# Patient Record
Sex: FEMALE | Race: BLACK OR AFRICAN AMERICAN | NOT HISPANIC OR LATINO | Employment: UNEMPLOYED | URBAN - METROPOLITAN AREA
[De-identification: names, ages, dates, MRNs, and addresses within clinical notes are randomized per-mention and may not be internally consistent; named-entity substitution may affect disease eponyms.]

---

## 2017-11-20 ENCOUNTER — OFFICE VISIT (OUTPATIENT)
Dept: BARIATRICS/WEIGHT MGMT | Facility: CLINIC | Age: 52
End: 2017-11-20

## 2017-11-20 VITALS
WEIGHT: 139.5 LBS | RESPIRATION RATE: 18 BRPM | HEART RATE: 74 BPM | OXYGEN SATURATION: 99 % | TEMPERATURE: 97.8 F | HEIGHT: 58 IN | DIASTOLIC BLOOD PRESSURE: 103 MMHG | BODY MASS INDEX: 29.28 KG/M2 | SYSTOLIC BLOOD PRESSURE: 163 MMHG

## 2017-11-20 DIAGNOSIS — E55.9 HYPOVITAMINOSIS D: ICD-10-CM

## 2017-11-20 DIAGNOSIS — R53.83 FATIGUE, UNSPECIFIED TYPE: Primary | ICD-10-CM

## 2017-11-20 DIAGNOSIS — K91.2 POSTGASTRECTOMY MALABSORPTION: ICD-10-CM

## 2017-11-20 DIAGNOSIS — Z13.21 MALNUTRITION SCREEN: ICD-10-CM

## 2017-11-20 DIAGNOSIS — Z90.3 POSTGASTRECTOMY MALABSORPTION: ICD-10-CM

## 2017-11-20 DIAGNOSIS — Z13.0 SCREENING, IRON DEFICIENCY ANEMIA: ICD-10-CM

## 2017-11-20 PROCEDURE — 99214 OFFICE O/P EST MOD 30 MIN: CPT | Performed by: SURGERY

## 2017-11-20 RX ORDER — ESCITALOPRAM OXALATE 20 MG/1
20 TABLET ORAL DAILY
COMMUNITY
Start: 2017-10-21

## 2017-11-20 RX ORDER — ESTRADIOL 0.1 MG/D
1 FILM, EXTENDED RELEASE TRANSDERMAL 2 TIMES WEEKLY
COMMUNITY
Start: 2017-10-21 | End: 2020-01-11 | Stop reason: HOSPADM

## 2017-11-20 NOTE — PATIENT INSTRUCTIONS
Discussed how to improve habits:    Breakfast to be a Premier  Snack (MUST bring from home): apple and peanut butter, banana  Lunch: will buy 4 Oz. Chicken at work  Dinner: meat + vegetable side dish.    Snack after dinner: keep to 150 calories or less    Walk for exercise 30 minutes, 5 days per week.

## 2017-11-20 NOTE — PROGRESS NOTES
"[facility] Bariatric Surgery  [address]  [facility] [id number] [phone]        Patient Name:  Iris Varma.  :  1965      Date of Visit: 2017      Reason for Visit:   4 years postop      HPI: Iris Varma is a 52 y.o. female s/p LSG/HHR 1913 by GDW.  Last office visit was in 2015 and apparently had not been seen since DOS (did not even make 1 week appointment).  At that time was unsure of how much protein intake and had complaint of fatigue/hair loss.  LOV weight was 125 pounds.      Doing well. C/o return of reflux x 2 months.  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  Does not take any PPI/H2 blocker.  Occasionally chews Tums.  Reflux symptoms are daily.  Worse after tomatoes.  Denies dysphagia, nausea, vomiting and abdominal pain.  Getting ??g prot/day.  \"I'm not eating any protein.  I don't know why not.\"  Drinking 32 fluid oz/day.  Exercising: walks at work as an , does no other special exercise.     Presurgery weight: 181 pounds.  Today's weight is 139 lb 8 oz (63.3 kg) pounds, today's  Body mass index is 29.16 kg/(m^2)., and her weight loss since surgery is 42 pounds.  Has gained 14 pound since last visit.      She attributes being back to work as why she hasn't been doing well planning meals/portions/healthful foods.      Breakfast: pack of peanut butter cracker (6g protein, 250 emory)  SnacK: vending machine, chips or peanut butter crackers (6g protein, 250 emory)  Lunch: 4 oz. Chicken (20 g, 200 emory)  Dinner: Piece of pizza or spaghetti + meat sauce (protein ~5 g, calories ~500).    +/- SnacK: maybe a few more bites of spaghetti  Drinks real soft drink x 1 qday 20 oz.  No sweet tea or sugary coffee drink.    \"I'm a sweet addict:\"  Cinnamon rolls, cookies, cheesecake (may eat in place of a meal)    Discussed how to improve habits:  Breakfast to be a Premier  Snack (MUST bring from " "home): apple and peanut butter, banana  Lunch: will buy 4 Oz. Chicken at work  Dinner: meat + vegetable side dish.    Snack after dinner: keep to 150 calories or less  Walk for exercise 30 minutes, 5 days per week.    Past Medical History:   Diagnosis Date   • Allergic rhinitis    • Anxiety    • Arthritis    • Chronic back pain    • Depression    • Diabetes mellitus    • Dyspnea on exertion    • Elevated LFTs    • Fatigue    • GERD (gastroesophageal reflux disease)    • Helicobacter pylori (H. pylori) infection    • Hiatal hernia    • Hyperlipidemia    • Hypertension    • Hypokalemia    • Insomnia    • Joint pain    • Obesity    • Peripheral edema    • Renal insufficiency    • Vitamin D deficiency      Past Surgical History:   Procedure Laterality Date   • BREAST BIOPSY Right 2012   •  SECTION  ,    • COLONOSCOPY     • GASTRIC SLEEVE LAPAROSCOPIC  2013    HHR   GDW   • HYSTERECTOMY      w/ oophorectomy   • TONSILLECTOMY       Outpatient Prescriptions Marked as Taking for the 17 encounter (Office Visit) with Sania Jaime MD   Medication Sig Dispense Refill   • escitalopram (LEXAPRO) 20 MG tablet      • estradiol (MINIVELLE, VIVELLE-DOT) 0.1 MG/24HR patch      • Naproxen Sodium (ALEVE PO) Take  by mouth.         Allergies   Allergen Reactions   • Lisinopril Swelling     Lips       Social History     Social History   • Marital status:      Spouse name: N/A   • Number of children: N/A   • Years of education: N/A     Occupational History   • Not on file.     Social History Main Topics   • Smoking status: Never Smoker   • Smokeless tobacco: Never Used   • Alcohol use No   • Drug use: No   • Sexual activity: Not on file     Other Topics Concern   • Not on file     Social History Narrative       BP (!) 163/103 (BP Location: Left arm, Patient Position: Sitting, Cuff Size: Large Adult)  Pulse 74  Temp 97.8 °F (36.6 °C) (Temporal Artery )   Resp 18  Ht 58\" (147.3 cm) "  Wt 139 lb 8 oz (63.3 kg) Comment: lov 125lb  SpO2 99%  BMI 29.16 kg/m2    Physical Exam   Constitutional: She is oriented to person, place, and time. She appears well-developed and well-nourished. No distress.   HENT:   Head: Normocephalic and atraumatic.   Mouth/Throat: No oropharyngeal exudate.   Eyes: EOM are normal. Pupils are equal, round, and reactive to light. No scleral icterus.   Pulmonary/Chest: Effort normal. No respiratory distress.   Abdominal: Soft. She exhibits no distension. There is no tenderness.   Incisions well-healed   Neurological: She is alert and oriented to person, place, and time. No cranial nerve deficit.   Skin: Skin is warm and dry.   Psychiatric: She has a normal mood and affect. Her behavior is normal. Judgment and thought content normal.         Assessment:  4 years s/p LSG    ICD-10-CM ICD-9-CM   1. Fatigue, unspecified type R53.83 780.79   2. Screening, iron deficiency anemia Z13.0 V78.0   3. 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 Hypovitaminosis D E55.9 268.9   5. Malnutrition screen Z13.21 V77.2         Plan:  Doing well. Continue w/ good food choices and healthy habits.  Strive for protein >70g/day.  Start routine exercise.  Other instructions above and given to patient in after visit summary.  Pt instructed to research keto diet and try it to break the cycle of eating carbs.  Routine bariatric labs ordered.  Continue vitamins w/ adjustments pending lab results.  Call w/ problems/concerns.     The patient was instructed to follow up in 6 months for weigh in, sooner if needed.    note: approx 15 of the 25 minute visit was spent counseling on nutrition and necessary dietary/lifestyle modifications.    Sania Jaime MD

## 2017-11-23 LAB
25(OH)D3+25(OH)D2 SERPL-MCNC: 12.1 NG/ML (ref 30–100)
A-TOCOPHEROL VIT E SERPL-MCNC: 19.6 MG/L (ref 5.3–16.8)
ALBUMIN SERPL-MCNC: 3.9 G/DL (ref 3.5–5.5)
ALBUMIN/GLOB SERPL: 1.3 {RATIO} (ref 1.2–2.2)
ALP SERPL-CCNC: 99 IU/L (ref 39–117)
ALT SERPL-CCNC: 8 IU/L (ref 0–32)
AST SERPL-CCNC: 15 IU/L (ref 0–40)
BASOPHILS # BLD AUTO: 0.1 X10E3/UL (ref 0–0.2)
BASOPHILS NFR BLD AUTO: 1 %
BILIRUB SERPL-MCNC: 0.5 MG/DL (ref 0–1.2)
BUN SERPL-MCNC: 12 MG/DL (ref 6–24)
BUN/CREAT SERPL: 14 (ref 9–23)
CALCIUM SERPL-MCNC: 9.1 MG/DL (ref 8.7–10.2)
CHLORIDE SERPL-SCNC: 100 MMOL/L (ref 96–106)
CO2 SERPL-SCNC: 26 MMOL/L (ref 18–29)
CREAT SERPL-MCNC: 0.84 MG/DL (ref 0.57–1)
EOSINOPHIL # BLD AUTO: 0.2 X10E3/UL (ref 0–0.4)
EOSINOPHIL NFR BLD AUTO: 2 %
ERYTHROCYTE [DISTWIDTH] IN BLOOD BY AUTOMATED COUNT: 16.9 % (ref 12.3–15.4)
FERRITIN SERPL-MCNC: 29 NG/ML (ref 15–150)
FOLATE SERPL-MCNC: 9.7 NG/ML
GFR SERPLBLD CREATININE-BSD FMLA CKD-EPI: 80 ML/MIN/1.73
GFR SERPLBLD CREATININE-BSD FMLA CKD-EPI: 92 ML/MIN/1.73
GLOBULIN SER CALC-MCNC: 3 G/DL (ref 1.5–4.5)
GLUCOSE SERPL-MCNC: 93 MG/DL (ref 65–99)
HCT VFR BLD AUTO: 36 % (ref 34–46.6)
HGB BLD-MCNC: 12.1 G/DL (ref 11.1–15.9)
IMM GRANULOCYTES # BLD: 0 X10E3/UL (ref 0–0.1)
IMM GRANULOCYTES NFR BLD: 0 %
IRON SERPL-MCNC: 107 UG/DL (ref 27–159)
LYMPHOCYTES # BLD AUTO: 2.7 X10E3/UL (ref 0.7–3.1)
LYMPHOCYTES NFR BLD AUTO: 30 %
MAGNESIUM SERPL-MCNC: 2.1 MG/DL (ref 1.6–2.3)
MCH RBC QN AUTO: 28.3 PG (ref 26.6–33)
MCHC RBC AUTO-ENTMCNC: 33.6 G/DL (ref 31.5–35.7)
MCV RBC AUTO: 84 FL (ref 79–97)
METHYLMALONATE SERPL-SCNC: 93 NMOL/L (ref 0–378)
MONOCYTES # BLD AUTO: 0.5 X10E3/UL (ref 0.1–0.9)
MONOCYTES NFR BLD AUTO: 5 %
NEUTROPHILS # BLD AUTO: 5.5 X10E3/UL (ref 1.4–7)
NEUTROPHILS NFR BLD AUTO: 62 %
PHOSPHATE SERPL-MCNC: 3 MG/DL (ref 2.5–4.5)
PLATELET # BLD AUTO: 368 X10E3/UL (ref 150–379)
POTASSIUM SERPL-SCNC: 4.1 MMOL/L (ref 3.5–5.2)
PREALB SERPL-MCNC: 19 MG/DL (ref 10–36)
PROT SERPL-MCNC: 6.9 G/DL (ref 6–8.5)
PTH-INTACT SERPL-MCNC: 62 PG/ML (ref 15–65)
RBC # BLD AUTO: 4.27 X10E6/UL (ref 3.77–5.28)
SODIUM SERPL-SCNC: 140 MMOL/L (ref 134–144)
VIT A SERPL-MCNC: 40 UG/DL (ref 20–65)
VIT B1 BLD-SCNC: 84.7 NMOL/L (ref 66.5–200)
WBC # BLD AUTO: 8.9 X10E3/UL (ref 3.4–10.8)
ZINC SERPL-MCNC: 67 UG/DL (ref 56–134)

## 2017-12-04 RX ORDER — ERGOCALCIFEROL 1.25 MG/1
50000 CAPSULE ORAL WEEKLY
Qty: 12 CAPSULE | Refills: 0 | Status: SHIPPED | OUTPATIENT
Start: 2017-12-04 | End: 2019-10-09

## 2018-07-06 ENCOUNTER — OFFICE VISIT (OUTPATIENT)
Dept: BARIATRICS/WEIGHT MGMT | Facility: CLINIC | Age: 53
End: 2018-07-06

## 2018-07-06 VITALS
OXYGEN SATURATION: 99 % | RESPIRATION RATE: 18 BRPM | WEIGHT: 141 LBS | SYSTOLIC BLOOD PRESSURE: 110 MMHG | HEIGHT: 58 IN | DIASTOLIC BLOOD PRESSURE: 70 MMHG | TEMPERATURE: 97.8 F | BODY MASS INDEX: 29.6 KG/M2 | HEART RATE: 68 BPM

## 2018-07-06 DIAGNOSIS — R10.13 EPIGASTRIC PAIN: ICD-10-CM

## 2018-07-06 DIAGNOSIS — K21.9 GASTROESOPHAGEAL REFLUX DISEASE, ESOPHAGITIS PRESENCE NOT SPECIFIED: Primary | ICD-10-CM

## 2018-07-06 PROCEDURE — 99214 OFFICE O/P EST MOD 30 MIN: CPT | Performed by: SURGERY

## 2018-07-06 RX ORDER — OMEPRAZOLE 40 MG/1
40 CAPSULE, DELAYED RELEASE ORAL DAILY
Qty: 3 CAPSULE | Refills: 3 | Status: SHIPPED | OUTPATIENT
Start: 2018-07-06 | End: 2019-10-09

## 2018-07-06 RX ORDER — CARVEDILOL 12.5 MG/1
12.5 TABLET ORAL 2 TIMES DAILY WITH MEALS
COMMUNITY

## 2018-07-06 NOTE — PROGRESS NOTES
[facility] Bariatric Surgery  [address]  [facility] [id number] [phone]        Patient Name: Iris Varma.  YOB: 1965      Date of Visit: 2018      Reason for Visit:  GERD    HPI:  Iris Varma is a 52 y.o. female s/p LSG/HHR 1913 by GDW.  Has had reflux for a while.  On Pepcid since she went to ED a few months ago for chest pain that was attributed to reflux.  Takes Tums frequently.  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      C/o severe epigastric pain that radiates up to chest and around left ribs.  Worse with popcorn.  A little worse with dairy.  Avoids spicy foods.        Past Medical History:   Diagnosis Date   • Allergic rhinitis    • Anxiety    • Arthritis    • Chronic back pain    • Depression    • Diabetes mellitus (CMS/HCC)    • Dyspnea on exertion    • Elevated LFTs    • Fatigue    • GERD (gastroesophageal reflux disease)    • Helicobacter pylori (H. pylori) infection    • Hiatal hernia    • Hyperlipidemia    • Hypertension    • Hypokalemia    • Insomnia    • Joint pain    • Obesity    • Peripheral edema    • Renal insufficiency    • Vitamin D deficiency      Past Surgical History:   Procedure Laterality Date   • BREAST BIOPSY Right 2012   •  SECTION  ,    • COLONOSCOPY     • GASTRIC SLEEVE LAPAROSCOPIC  2013    HHR   GDW   • HYSTERECTOMY      w/ oophorectomy   • TONSILLECTOMY       Outpatient Prescriptions Marked as Taking for the 18 encounter (Office Visit) with Sania Jaime MD   Medication Sig Dispense Refill   • carvedilol (COREG) 12.5 MG tablet Take 12.5 mg by mouth 2 (Two) Times a Day With Meals.     • escitalopram (LEXAPRO) 20 MG tablet      • estradiol (MINIVELLE, VIVELLE-DOT) 0.1 MG/24HR patch      • Naproxen Sodium (ALEVE PO) Take  by mouth.     • vitamin D (ERGOCALCIFEROL) 06670 units capsule capsule Take 1 capsule by mouth 1 (One) Time Per Week. 12 capsule 0     Allergies    Allergen Reactions   • Lisinopril Swelling     Lips       Social History     Social History   • Marital status:      Spouse name: N/A   • Number of children: N/A   • Years of education: N/A     Occupational History   • Not on file.     Social History Main Topics   • Smoking status: Never Smoker   • Smokeless tobacco: Never Used   • Alcohol use No   • Drug use: No   • Sexual activity: Not on file     Other Topics Concern   • Not on file     Social History Narrative   • No narrative on file       Vitals:    07/06/18 1426   BP: 110/70   Pulse: 68   Resp: 18   Temp: 97.8 °F (36.6 °C)   SpO2: 99%     Weight 64 kg (141 lb)  Body mass index is 29.47 kg/m².    Physical Exam   Constitutional: She is oriented to person, place, and time. She appears well-developed and well-nourished. No distress.   HENT:   Head: Normocephalic and atraumatic.   Mouth/Throat: No oropharyngeal exudate.   Pulmonary/Chest: Effort normal. No respiratory distress.   Abdominal: Soft. She exhibits no distension. There is no tenderness.   Neurological: She is alert and oriented to person, place, and time. No cranial nerve deficit.   Skin: Skin is warm and dry. No rash noted. She is not diaphoretic. No erythema.   Psychiatric: She has a normal mood and affect. Her behavior is normal. Judgment and thought content normal.         Assessment:      ICD-10-CM ICD-9-CM   1. Gastroesophageal reflux disease, esophagitis presence not specified K21.9 530.81   2. Epigastric pain R10.13 789.06       Plan: Omeprazole 40 mg daily.  Ok to have PRN H2 blockers and Tums.  Call me if reflux not improved in 2 weeks, can increase dose of PPI.    UGI ordered, may need EGD.  Re: epigastric pain, will also start GB workup with ultrasound, may need HIDA.    Call w/ issues/concerns.  RTC sooner w/ problems.     The patient was instructed to follow up in pending test results.

## 2018-07-30 ENCOUNTER — HOSPITAL ENCOUNTER (OUTPATIENT)
Dept: ULTRASOUND IMAGING | Facility: HOSPITAL | Age: 53
Discharge: HOME | End: 2018-07-30
Attending: SURGERY | Admitting: SURGERY

## 2018-07-30 ENCOUNTER — HOSPITAL ENCOUNTER (OUTPATIENT)
Dept: GENERAL RADIOLOGY | Facility: HOSPITAL | Age: 53
Discharge: HOME | End: 2018-07-30
Attending: SURGERY

## 2018-07-30 DIAGNOSIS — R10.13 EPIGASTRIC PAIN: ICD-10-CM

## 2018-07-30 DIAGNOSIS — K21.9 GASTROESOPHAGEAL REFLUX DISEASE, ESOPHAGITIS PRESENCE NOT SPECIFIED: ICD-10-CM

## 2018-07-30 PROCEDURE — 74241: CPT

## 2018-07-30 PROCEDURE — 76705 ECHO EXAM OF ABDOMEN: CPT

## 2018-07-30 RX ADMIN — BARIUM SULFATE 100 ML: 960 POWDER, FOR SUSPENSION ORAL at 13:19

## 2018-08-02 ENCOUNTER — TELEPHONE (OUTPATIENT)
Dept: BARIATRICS/WEIGHT MGMT | Facility: CLINIC | Age: 53
End: 2018-08-02

## 2018-08-02 DIAGNOSIS — K21.9 GASTROESOPHAGEAL REFLUX DISEASE, ESOPHAGITIS PRESENCE NOT SPECIFIED: Primary | ICD-10-CM

## 2018-08-02 DIAGNOSIS — R10.13 EPIGASTRIC PAIN: Primary | ICD-10-CM

## 2018-08-02 RX ORDER — SODIUM CHLORIDE 9 MG/ML
150 INJECTION, SOLUTION INTRAVENOUS CONTINUOUS
Status: CANCELLED | OUTPATIENT
Start: 2018-08-02 | End: 2019-08-02

## 2018-08-02 NOTE — TELEPHONE ENCOUNTER
Notified pt that her GBUS showed no stones only sludge and we need a Hida scan for further eval.  Also, I let her know that you want pt to have a EGD and she will be receiving a message from Vicente stephens her scheduled. Pt verbalized understanding.

## 2018-08-02 NOTE — TELEPHONE ENCOUNTER
----- Message from Sania Jaime MD sent at 8/2/2018 11:33 AM EDT -----  Please let her know GBUS showed no stones, only sludge, and I've ordered a HIDA.  EGD also ordered, should receive call soon to schedule.

## 2018-08-09 ENCOUNTER — HOSPITAL ENCOUNTER (OUTPATIENT)
Dept: NUCLEAR MEDICINE | Facility: HOSPITAL | Age: 53
Discharge: HOME | End: 2018-08-09
Attending: SURGERY

## 2018-08-09 DIAGNOSIS — R10.13 EPIGASTRIC PAIN: ICD-10-CM

## 2018-08-09 PROCEDURE — 25010000002 SINCALIDE PER 5 MCG: Performed by: SURGERY

## 2018-08-09 PROCEDURE — 78227 HEPATOBIL SYST IMAGE W/DRUG: CPT

## 2018-08-09 PROCEDURE — 0 TECHNETIUM TC 99M MEBROFENIN KIT: Performed by: SURGERY

## 2018-08-09 PROCEDURE — A9537 TC99M MEBROFENIN: HCPCS | Performed by: SURGERY

## 2018-08-09 RX ORDER — KIT FOR THE PREPARATION OF TECHNETIUM TC 99M MEBROFENIN 45 MG/10ML
1 INJECTION, POWDER, LYOPHILIZED, FOR SOLUTION INTRAVENOUS
Status: COMPLETED | OUTPATIENT
Start: 2018-08-09 | End: 2018-08-09

## 2018-08-09 RX ADMIN — SINCALIDE 1.3 MCG: 5 INJECTION, POWDER, LYOPHILIZED, FOR SOLUTION INTRAVENOUS at 15:25

## 2018-08-09 RX ADMIN — MEBROFENIN 1 DOSE: 45 INJECTION, POWDER, LYOPHILIZED, FOR SOLUTION INTRAVENOUS at 14:13

## 2018-08-13 ENCOUNTER — TELEPHONE (OUTPATIENT)
Dept: BARIATRICS/WEIGHT MGMT | Facility: CLINIC | Age: 53
End: 2018-08-13

## 2018-08-15 DIAGNOSIS — K81.9 ACALCULOUS CHOLECYSTITIS: Primary | ICD-10-CM

## 2018-08-15 RX ORDER — SODIUM CHLORIDE 9 MG/ML
150 INJECTION, SOLUTION INTRAVENOUS CONTINUOUS
Status: CANCELLED | OUTPATIENT
Start: 2018-08-15

## 2018-08-28 ENCOUNTER — TELEPHONE (OUTPATIENT)
Dept: BARIATRICS/WEIGHT MGMT | Facility: CLINIC | Age: 53
End: 2018-08-28

## 2018-08-28 NOTE — TELEPHONE ENCOUNTER
I let pt know that you have ordered a lap nahomi/EGD and Vicente should be in contact with her to get her scheduled. Pt verbalized understanding.

## 2018-08-28 NOTE — TELEPHONE ENCOUNTER
Pt called in wanting to know the results of her Hida scan is and what if the next step for her Gallbladder. Please advise, thank you.

## 2018-09-19 ENCOUNTER — OFFICE VISIT (OUTPATIENT)
Dept: BARIATRICS/WEIGHT MGMT | Facility: CLINIC | Age: 53
End: 2018-09-19

## 2018-09-19 VITALS
OXYGEN SATURATION: 99 % | SYSTOLIC BLOOD PRESSURE: 155 MMHG | BODY MASS INDEX: 29.6 KG/M2 | TEMPERATURE: 97.9 F | RESPIRATION RATE: 18 BRPM | DIASTOLIC BLOOD PRESSURE: 104 MMHG | HEART RATE: 77 BPM | HEIGHT: 58 IN | WEIGHT: 141.01 LBS

## 2018-09-19 DIAGNOSIS — K81.9 ACALCULOUS CHOLECYSTITIS: Primary | ICD-10-CM

## 2018-09-19 DIAGNOSIS — R53.83 FATIGUE, UNSPECIFIED TYPE: ICD-10-CM

## 2018-09-19 DIAGNOSIS — R10.13 EPIGASTRIC PAIN: ICD-10-CM

## 2018-09-19 DIAGNOSIS — K21.9 GASTROESOPHAGEAL REFLUX DISEASE, ESOPHAGITIS PRESENCE NOT SPECIFIED: ICD-10-CM

## 2018-09-19 PROCEDURE — 99215 OFFICE O/P EST HI 40 MIN: CPT | Performed by: SURGERY

## 2018-09-19 NOTE — PROGRESS NOTES
[facility] BARIATRIC SURGERY  [address]  [facility] [id number] [phone]      Patient  Name:  Iris Varma  :  1965      Date of Visit: 2018      Chief Complaint:  Epigastric pain, reflux    History of Present Illness:  Iris Varma is a 53 y.o. female who is s/p  LSG/HHR 1913 by GDW    UGI in 2018 no HH, + reflux.  Started on once daily PPI with some relief, but still has reflux sensation.   Epigastric pain x several months.  HIDA showed 19% EF, c/w acalcluous cholecystitis.  Sometimes associated with n/v.  Worse with dairy, greasy foods.      Pulled a muscle recently, taking flexeril/tramadol PRN, last one is tonight.      Set up for lap nahomi/EGD.      HTN stable on Coreg.  Depression stable on Lexapro.  GERD stable on Prilosec.   Chronic back pain stable.      Past Medical History:   Diagnosis Date   • Allergic rhinitis    • Anxiety    • Arthritis    • Chronic back pain    • Depression    • Diabetes mellitus (CMS/HCC)    • Dyspnea on exertion    • Elevated LFTs    • Fatigue    • GERD (gastroesophageal reflux disease)    • Helicobacter pylori (H. pylori) infection    • Hiatal hernia    • Hyperlipidemia    • Hypertension    • Hypokalemia    • Insomnia    • Joint pain    • Obesity    • Peripheral edema    • Renal insufficiency    • Vitamin D deficiency      Past Surgical History:   Procedure Laterality Date   • BREAST BIOPSY Right    •  SECTION  ,    • COLONOSCOPY     • GASTRIC SLEEVE LAPAROSCOPIC  2013    HHR   GDW   • HYSTERECTOMY      w/ oophorectomy   • TONSILLECTOMY         Allergies   Allergen Reactions   • Lisinopril Swelling     Lips       Current Outpatient Prescriptions:   •  carvedilol (COREG) 12.5 MG tablet, Take 12.5 mg by mouth 2 (Two) Times a Day With Meals., Disp: , Rfl:   •  escitalopram (LEXAPRO) 20 MG tablet, , Disp: , Rfl:   •  estradiol (MINIVELLE, VIVELLE-DOT) 0.1 MG/24HR patch, , Disp: ,  Rfl:   •  Naproxen Sodium (ALEVE PO), Take  by mouth., Disp: , Rfl:   •  omeprazole (priLOSEC) 40 MG capsule, Take 1 capsule by mouth Daily., Disp: 3 capsule, Rfl: 3  •  vitamin D (ERGOCALCIFEROL) 03291 units capsule capsule, Take 1 capsule by mouth 1 (One) Time Per Week., Disp: 12 capsule, Rfl: 0    Social History     Social History   • Marital status:      Spouse name: N/A   • Number of children: N/A   • Years of education: N/A     Occupational History   • Not on file.     Social History Main Topics   • Smoking status: Never Smoker   • Smokeless tobacco: Never Used   • Alcohol use No   • Drug use: No   • Sexual activity: Not on file     Other Topics Concern   • Not on file     Social History Narrative   • No narrative on file     Family History   Problem Relation Age of Onset   • Cancer Mother    • Diabetes Mother    • Hypertension Mother    • Stroke Mother    • Hypertension Sister    • Hypertension Brother    • Diabetes Maternal Grandmother    • Hypertension Maternal Grandfather    • Stroke Maternal Grandfather    • Cancer Paternal Grandmother        Review of Systems   Constitutional: Positive for fatigue and unexpected weight gain. Negative for chills, diaphoresis, fever and unexpected weight loss.   HENT: Negative for congestion and facial swelling.    Eyes: Negative for blurred vision, double vision and discharge.   Respiratory: Negative for chest tightness, shortness of breath and stridor.    Cardiovascular: Negative for chest pain, palpitations and leg swelling.   Gastrointestinal: Positive for abdominal pain, GERD and indigestion. Negative for blood in stool.   Endocrine: Negative for polydipsia.   Genitourinary: Negative for hematuria.   Musculoskeletal: Positive for arthralgias.   Skin: Negative for color change.   Allergic/Immunologic: Negative for immunocompromised state.   Neurological: Negative for confusion.   Psychiatric/Behavioral: Negative for self-injury.        Physical Exam:  Vital  Signs:  Weight: 64 kg (141 lb 0.2 oz)   Body mass index is 29.47 kg/m².  Temp: 97.9 °F (36.6 °C)   Heart Rate: 77   BP: (!) 155/104     Physical Exam   Constitutional: She is oriented to person, place, and time. She appears well-developed and well-nourished. No distress.   HENT:   Head: Normocephalic and atraumatic.   Mouth/Throat: No oropharyngeal exudate.   Eyes: Pupils are equal, round, and reactive to light. Conjunctivae and EOM are normal.   Pulmonary/Chest: Effort normal. No respiratory distress.   Abdominal: Soft. She exhibits no distension.   Lap scars and low transverse scar   Neurological: She is alert and oriented to person, place, and time. No cranial nerve deficit.   Skin: Skin is warm and dry. She is not diaphoretic. No pallor.   Psychiatric: She has a normal mood and affect. Her behavior is normal. Thought content normal.       There is no problem list on file for this patient.      Assessment:    Iris Varma is a 53 y.o. year old female with acalculous cholecystitis.      Plan:    Lap nahomi, EGD at [facility].    R/b/a: infection, bleeding, injury to surrounding structure, heart attack, stroke death.  Questions answered, she wishes to proceed.  Avoid NSAIDs 1 week before surgery.    Aware that if has finding of hiatal hernia on EGD, will need separately scheduled hiatal hernia repair in main OR.    MDM high: elective surgery with risk factor of HTN.  4+ chronic medical diagnoses reviewed.      Sania Jaime MD

## 2018-09-23 ENCOUNTER — APPOINTMENT (OUTPATIENT)
Dept: PREADMISSION TESTING | Facility: HOSPITAL | Age: 53
End: 2018-09-23

## 2018-09-23 LAB
HCT VFR BLD AUTO: 34.9 % (ref 34.5–44)
POTASSIUM BLD-SCNC: 3.2 MMOL/L (ref 3.5–5.5)

## 2018-09-23 PROCEDURE — 36415 COLL VENOUS BLD VENIPUNCTURE: CPT

## 2018-09-23 PROCEDURE — 85014 HEMATOCRIT: CPT | Performed by: ANESTHESIOLOGY

## 2018-09-23 PROCEDURE — 93005 ELECTROCARDIOGRAM TRACING: CPT

## 2018-09-23 PROCEDURE — 93010 ELECTROCARDIOGRAM REPORT: CPT | Performed by: INTERNAL MEDICINE

## 2018-09-23 PROCEDURE — 84132 ASSAY OF SERUM POTASSIUM: CPT | Performed by: ANESTHESIOLOGY

## 2018-09-24 ENCOUNTER — OUTSIDE FACILITY SERVICE (OUTPATIENT)
Dept: BARIATRICS/WEIGHT MGMT | Facility: CLINIC | Age: 53
End: 2018-09-24

## 2018-09-24 ENCOUNTER — LAB REQUISITION (OUTPATIENT)
Dept: LAB | Facility: HOSPITAL | Age: 53
End: 2018-09-24

## 2018-09-24 DIAGNOSIS — K81.9 CHOLECYSTITIS: ICD-10-CM

## 2018-09-24 PROCEDURE — 88305 TISSUE EXAM BY PATHOLOGIST: CPT | Performed by: SURGERY

## 2018-09-24 PROCEDURE — 43239 EGD BIOPSY SINGLE/MULTIPLE: CPT | Performed by: SURGERY

## 2018-09-24 PROCEDURE — 47562 LAPAROSCOPIC CHOLECYSTECTOMY: CPT | Performed by: SURGERY

## 2018-09-24 PROCEDURE — 88304 TISSUE EXAM BY PATHOLOGIST: CPT | Performed by: SURGERY

## 2018-09-25 LAB
CYTO UR: NORMAL
LAB AP CASE REPORT: NORMAL
LAB AP CLINICAL INFORMATION: NORMAL
PATH REPORT.FINAL DX SPEC: NORMAL
PATH REPORT.GROSS SPEC: NORMAL

## 2018-09-27 DIAGNOSIS — K81.9 ACALCULOUS CHOLECYSTITIS: ICD-10-CM

## 2018-11-08 ENCOUNTER — OFFICE VISIT (OUTPATIENT)
Dept: BARIATRICS/WEIGHT MGMT | Facility: CLINIC | Age: 53
End: 2018-11-08

## 2018-11-08 VITALS
WEIGHT: 138 LBS | BODY MASS INDEX: 28.97 KG/M2 | SYSTOLIC BLOOD PRESSURE: 124 MMHG | TEMPERATURE: 98.1 F | HEIGHT: 58 IN | HEART RATE: 71 BPM | RESPIRATION RATE: 18 BRPM | DIASTOLIC BLOOD PRESSURE: 80 MMHG | OXYGEN SATURATION: 99 %

## 2018-11-08 DIAGNOSIS — Z98.84 STATUS POST BARIATRIC SURGERY: Primary | ICD-10-CM

## 2018-11-08 DIAGNOSIS — Z90.49 S/P CHOLE: ICD-10-CM

## 2018-11-08 PROCEDURE — 99024 POSTOP FOLLOW-UP VISIT: CPT | Performed by: PHYSICIAN ASSISTANT

## 2018-11-08 NOTE — PROGRESS NOTES
[facility] Bariatric Surgery  [address]  [facility] [address]  [phone]        Patient Name: Iris Varma.  YOB: 1965      Date of Visit: 11/8/2018      Reason for Visit:  6 weeks postop    HPI:  Iris Varma is a 53 y.o. female s/p lap nahomi/ EGD/ PAIGE with Dr. Tyler 9/24/18, previous LSG/HHR 8/19/13 by GDW    Findings: mild tertiary spasms, small amount of redundant cardia- otherwise unremarkable, no HH.     Patent states she is doing well today, no complaints or issues. All symptoms noted pre-lap nahomi have completely resolved. Denies nausea, abdominal pain, vomiting, dysphagia, fevers, chills. Voiding well and normal BM.  Reflux is well controlled on omeprazole 40mg with no breakthrough. No issues with incisions.  Feeling good overall.     Presurgery weight: 181 pounds. Today's weight is 62.6 kg (138 lb) pounds, today's  Body mass index is 28.84 kg/m²., and her weight loss since surgery is 43 pounds.      Final Diagnosis    1. GALLBLADDER, CHOLECYSTECTOMY:  Mild chronic cholecystitis.  No metaplasia or dysplasia identified.   2. GASTRIC ANTRUM BIOPSY:  Reactive gastropathic change.  Background of minimal chronic gastritis without activity.  Negative for intestinal metaplasia and dysplasia.  No Helicobacter pylori-like organisms identified on routine stain.   3. DISTAL ESOPHAGUS BIOPSY:  Squamous mucosa with basal layer hyperplasia, vascular ectasia and minimal increase in intraepithelial leukocytes without eosinophils.  Negative for intestinal metaplasia, dysplasia and glandular mucosa.  Histologic changes present are suggestive, but not diagnostic of reflux esophagitis.            Past Medical History:   Diagnosis Date   • Allergic rhinitis    • Anxiety    • Arthritis    • Chronic back pain    • Depression    • Diabetes mellitus (CMS/HCC)    • Dyspnea on exertion    • Elevated LFTs    • Fatigue    • GERD (gastroesophageal reflux disease)    •  Helicobacter pylori (H. pylori) infection    • Hiatal hernia    • Hyperlipidemia    • Hypertension    • Hypokalemia    • Insomnia    • Joint pain    • Obesity    • Peripheral edema    • Renal insufficiency    • Vitamin D deficiency        Past Surgical History:   Procedure Laterality Date   • BREAST BIOPSY Right 2012   •  SECTION  ,    • COLONOSCOPY     • GASTRIC SLEEVE LAPAROSCOPIC  2013    HHR   GDW   • HYSTERECTOMY      w/ oophorectomy   • TONSILLECTOMY       Outpatient Prescriptions Marked as Taking for the 18 encounter (Office Visit) with Priya Granger PA-C   Medication Sig Dispense Refill   • carvedilol (COREG) 12.5 MG tablet Take 12.5 mg by mouth 2 (Two) Times a Day With Meals.     • escitalopram (LEXAPRO) 20 MG tablet      • estradiol (MINIVELLE, VIVELLE-DOT) 0.1 MG/24HR patch      • Naproxen Sodium (ALEVE PO) Take  by mouth.     • omeprazole (priLOSEC) 40 MG capsule Take 1 capsule by mouth Daily. 3 capsule 3   • vitamin D (ERGOCALCIFEROL) 78567 units capsule capsule Take 1 capsule by mouth 1 (One) Time Per Week. 12 capsule 0     Allergies   Allergen Reactions   • Lisinopril Swelling     Lips       Social History     Social History   • Marital status:      Spouse name: N/A   • Number of children: N/A   • Years of education: N/A     Occupational History   • Not on file.     Social History Main Topics   • Smoking status: Never Smoker   • Smokeless tobacco: Never Used   • Alcohol use No   • Drug use: No   • Sexual activity: Not on file     Other Topics Concern   • Not on file     Social History Narrative   • No narrative on file       Vitals:    18 1539   BP: 124/80   Pulse: 71   Resp: 18   Temp: 98.1 °F (36.7 °C)   SpO2: 99%     Weight 62.6 kg (138 lb)  Body mass index is 28.84 kg/m².    Physical Exam   Constitutional: She is oriented to person, place, and time. She appears well-developed and well-nourished.   HENT:   Head: Normocephalic and atraumatic.    Cardiovascular: Normal rate, regular rhythm and normal heart sounds.    Pulmonary/Chest: Effort normal and breath sounds normal. No respiratory distress. She has no wheezes.   Abdominal: Soft. Bowel sounds are normal. She exhibits no distension. There is no tenderness.   Incisions healing well   Neurological: She is alert and oriented to person, place, and time.   Skin: Skin is warm and dry.   Psychiatric: She has a normal mood and affect. Her behavior is normal. Judgment and thought content normal.         Assessment:  s/p lap nhaomi/ EGD/ PAIGE with Dr. Tyler 9/24/18, previous LSG/HHR 8/19/13 by MIKIEW      ICD-10-CM ICD-9-CM   1. Status post bariatric surgery Z98.84 V45.86   2. S/P nahomi Z90.49 V45.79       Plan:  Doing well. Cont PPI daily, can d/c if asymptomatic. Follow up for routine LSG evaluation. Call or RTC with other concerns.

## 2019-10-09 ENCOUNTER — OFFICE VISIT (OUTPATIENT)
Dept: BARIATRICS/WEIGHT MGMT | Facility: CLINIC | Age: 54
End: 2019-10-09

## 2019-10-09 VITALS
BODY MASS INDEX: 28.86 KG/M2 | OXYGEN SATURATION: 99 % | HEART RATE: 74 BPM | TEMPERATURE: 97.4 F | DIASTOLIC BLOOD PRESSURE: 72 MMHG | SYSTOLIC BLOOD PRESSURE: 124 MMHG | RESPIRATION RATE: 18 BRPM | WEIGHT: 137.5 LBS | HEIGHT: 58 IN

## 2019-10-09 DIAGNOSIS — R12 HEARTBURN: ICD-10-CM

## 2019-10-09 DIAGNOSIS — R10.13 DYSPEPSIA: Primary | ICD-10-CM

## 2019-10-09 DIAGNOSIS — R11.2 NAUSEA AND VOMITING, INTRACTABILITY OF VOMITING NOT SPECIFIED, UNSPECIFIED VOMITING TYPE: ICD-10-CM

## 2019-10-09 DIAGNOSIS — E55.9 VITAMIN D DEFICIENCY: ICD-10-CM

## 2019-10-09 DIAGNOSIS — R10.13 EPIGASTRIC PAIN: ICD-10-CM

## 2019-10-09 PROCEDURE — 99214 OFFICE O/P EST MOD 30 MIN: CPT | Performed by: PHYSICIAN ASSISTANT

## 2019-10-09 RX ORDER — MINOCYCLINE HYDROCHLORIDE 100 MG/1
100 CAPSULE ORAL DAILY
COMMUNITY
Start: 2019-09-09

## 2019-10-09 RX ORDER — PROMETHAZINE HYDROCHLORIDE 25 MG/1
12.5 TABLET ORAL EVERY 6 HOURS PRN
Qty: 30 TABLET | Refills: 0 | Status: SHIPPED | OUTPATIENT
Start: 2019-10-09 | End: 2021-12-28

## 2019-10-09 RX ORDER — OMEPRAZOLE 40 MG/1
40 CAPSULE, DELAYED RELEASE ORAL DAILY
Qty: 30 CAPSULE | Refills: 0 | Status: SHIPPED | OUTPATIENT
Start: 2019-10-09 | End: 2021-12-28

## 2019-10-09 RX ORDER — TRIAMTERENE AND HYDROCHLOROTHIAZIDE 37.5; 25 MG/1; MG/1
1 CAPSULE ORAL DAILY
COMMUNITY
Start: 2019-09-11 | End: 2020-01-11 | Stop reason: HOSPADM

## 2019-10-09 NOTE — PROGRESS NOTES
"[facility] Bariatric Surgery  [address]  [facility] [id number] [phone]        Patient Name: Iris Varma.  YOB: 1965      Date of Visit: 10/09/2019      Reason for Visit:   reflux/N/V/AP      HPI:  Iris Varma is a 54 y.o. female s/p LSG/HHR 8/2013 GDW.     Presents w/ postprandial N/V/reflux and epigastric pain/fullness x 3 weeks.  TUMS + pepto + GasX + ginger ale \"calm\" the symptoms a little bit.  Worries she may have a recurrent hiatal hernia.  Says has had issues w/ hoarseness x 2 months prior to most recent issues.  Say ENT @ [facility] most recently, scope pending.  No other recent testing.  Is not on a PPI.  Does not have any nausea meds but would like some.  Appetite has been decreased most recently.  Denies unintentional weight loss.  Typically has daily bowel movements, but now bowel pattern alternating b/w diarrhea and constipation.     Takes NSAIDS prn, but not on a daily basis.  Denies steroid injections.  Denies tobacco use/exposure.   Not taking any vitamins.     Presurgery weight: 170 pounds. Today's weight is 62.4 kg (137 lb 8 oz) pounds, today's  Body mass index is 28.74 kg/m²., and her weight loss since surgery is 33 pounds.         Past Medical History:   Diagnosis Date   • Acne     on Minocycline   • Allergic rhinitis    • Anxiety    • Anxiety    • Arthritis    • Chronic back pain    • Depression    • Diabetes mellitus (CMS/HCC)    • Dyspnea on exertion    • Eczema     prn steroid cream   • Elevated LFTs    • Fatigue    • GERD (gastroesophageal reflux disease)    • Helicobacter pylori (H. pylori) infection    • Hiatal hernia    • Hormone replacement therapy (HRT)     s/p TAHBSO   • Hyperlipidemia    • Hypertension    • Hypokalemia    • Insomnia    • Joint pain    • Obesity    • Peripheral edema     daily Maxzide   • Renal insufficiency    • Vitamin D deficiency      Past Surgical History:   Procedure Laterality Date   • BREAST BIOPSY " Right    •  SECTION  ,    • COLONOSCOPY     • GASTRIC SLEEVE LAPAROSCOPIC  2013    HHR   GDW   • HYSTERECTOMY      w/ oophorectomy   • LAPAROSCOPIC CHOLECYSTECTOMY  2018    w/ PAIGE by Dr. Tyler   • TONSILLECTOMY       Outpatient Medications Marked as Taking for the 10/9/19 encounter (Office Visit) with Daisy Parsons PA   Medication Sig Dispense Refill   • carvedilol (COREG) 12.5 MG tablet Take 12.5 mg by mouth 2 (Two) Times a Day With Meals.     • escitalopram (LEXAPRO) 20 MG tablet Take 20 mg by mouth Daily.     • estradiol (MINIVELLE, VIVELLE-DOT) 0.1 MG/24HR patch      • minocycline (MINOCIN,DYNACIN) 100 MG capsule Take 100 mg by mouth Daily.     • triamcinolone (KENALOG) 0.1 % ointment Apply  topically to the appropriate area as directed As Needed.     • triamterene-hydrochlorothiazide (DYAZIDE) 37.5-25 MG per capsule Take 1 capsule by mouth Daily.     • [DISCONTINUED] Naproxen Sodium (ALEVE PO) Take  by mouth.     • [DISCONTINUED] omeprazole (priLOSEC) 40 MG capsule Take 1 capsule by mouth Daily. 3 capsule 3   • [DISCONTINUED] vitamin D (ERGOCALCIFEROL) 75590 units capsule capsule Take 1 capsule by mouth 1 (One) Time Per Week. 12 capsule 0     Allergies   Allergen Reactions   • Lisinopril Swelling     Lips       Social History     Socioeconomic History   • Marital status:      Spouse name: Not on file   • Number of children: Not on file   • Years of education: Not on file   • Highest education level: Not on file   Tobacco Use   • Smoking status: Never Smoker   • Smokeless tobacco: Never Used   Substance and Sexual Activity   • Alcohol use: No   • Drug use: No       Vitals:    10/09/19 1545   BP: 124/72   Pulse: 74   Resp: 18   Temp: 97.4 °F (36.3 °C)   SpO2: 99%     Weight 62.4 kg (137 lb 8 oz)  Body mass index is 28.74 kg/m².    Physical Exam   Constitutional: She appears well-developed and well-nourished. She is cooperative.   HENT:   Mouth/Throat:  "Oropharynx is clear and moist and mucous membranes are normal.   Eyes: Conjunctivae are normal. No scleral icterus.   Cardiovascular: Normal rate.   Pulmonary/Chest: Effort normal.   Abdominal: Soft. There is no tenderness.   Musculoskeletal: Normal range of motion. She exhibits no edema.   Neurological: She is alert.   Skin: Skin is warm and dry. No rash noted.   Psychiatric: She has a normal mood and affect. Judgment normal.         Assessment:   54 y.o. female s/p LSG/HHR 8/2013 GDW.     ICD-10-CM ICD-9-CM   1. Dyspepsia R10.13 536.8   2. Heartburn R12 787.1   3. Nausea and vomiting, intractability of vomiting not specified, unspecified vomiting type R11.2 787.01   4. Epigastric pain R10.13 789.06   5. Vitamin D deficiency E55.9 268.9       Plan:  Labs + UGI ordered to further evaluate.  Omeprazole 40mg daily + Phenergan 12.5mg prn RX.  Further input pending results.  Call w/ questions/concerns.       Addendum:  Recent labs reveal low Vit D, low potassium, abnl kidney function (w/ hx renal insufficiency noted).  RX for Vit D, PPI, phenergan given.      UGI 10/16/19 @[facility] reveals moderate reflux and \"small outpouching of contrast seen at the proximal portion of the vertical sleeve. This outpouching appears to be intraluminal, and may represent a small gastric diverticulum, or possibly postsurgical changes.\"    Patient's sx persist/unchanged.  ENT eval still (P).  Notes last Colonoscopy 2018 @ , reportedly unremarkable.  Will schedule EGD w/ Dr. Tyler for further eval.         "

## 2019-10-13 LAB
25(OH)D3+25(OH)D2 SERPL-MCNC: 25.9 NG/ML (ref 30–100)
ALBUMIN SERPL-MCNC: 4 G/DL (ref 3.5–5.5)
ALBUMIN/GLOB SERPL: 1.3 {RATIO} (ref 1.2–2.2)
ALP SERPL-CCNC: 96 IU/L (ref 39–117)
ALT SERPL-CCNC: 10 IU/L (ref 0–32)
AMYLASE SERPL-CCNC: 120 U/L (ref 31–124)
AST SERPL-CCNC: 18 IU/L (ref 0–40)
BASOPHILS # BLD AUTO: 0 X10E3/UL (ref 0–0.2)
BASOPHILS NFR BLD AUTO: 0 %
BILIRUB SERPL-MCNC: 0.3 MG/DL (ref 0–1.2)
BUN SERPL-MCNC: 18 MG/DL (ref 6–24)
BUN/CREAT SERPL: 16 (ref 9–23)
CALCIUM SERPL-MCNC: 9.3 MG/DL (ref 8.7–10.2)
CHLORIDE SERPL-SCNC: 97 MMOL/L (ref 96–106)
CO2 SERPL-SCNC: 27 MMOL/L (ref 20–29)
CREAT SERPL-MCNC: 1.11 MG/DL (ref 0.57–1)
EOSINOPHIL # BLD AUTO: 0.2 X10E3/UL (ref 0–0.4)
EOSINOPHIL NFR BLD AUTO: 2 %
ERYTHROCYTE [DISTWIDTH] IN BLOOD BY AUTOMATED COUNT: 16.6 % (ref 12.3–15.4)
FERRITIN SERPL-MCNC: 37 NG/ML (ref 15–150)
FOLATE SERPL-MCNC: 6.2 NG/ML
GLOBULIN SER CALC-MCNC: 3 G/DL (ref 1.5–4.5)
GLUCOSE SERPL-MCNC: 115 MG/DL (ref 65–99)
HCT VFR BLD AUTO: 35.3 % (ref 34–46.6)
HGB BLD-MCNC: 12.2 G/DL (ref 11.1–15.9)
IMM GRANULOCYTES # BLD AUTO: 0 X10E3/UL (ref 0–0.1)
IMM GRANULOCYTES NFR BLD AUTO: 0 %
IRON SERPL-MCNC: 49 UG/DL (ref 27–159)
LIPASE SERPL-CCNC: 65 U/L (ref 14–72)
LYMPHOCYTES # BLD AUTO: 3.5 X10E3/UL (ref 0.7–3.1)
LYMPHOCYTES NFR BLD AUTO: 34 %
Lab: NORMAL
MCH RBC QN AUTO: 29.1 PG (ref 26.6–33)
MCHC RBC AUTO-ENTMCNC: 34.6 G/DL (ref 31.5–35.7)
MCV RBC AUTO: 84 FL (ref 79–97)
METHYLMALONATE SERPL-SCNC: 209 NMOL/L (ref 0–378)
MONOCYTES # BLD AUTO: 0.5 X10E3/UL (ref 0.1–0.9)
MONOCYTES NFR BLD AUTO: 5 %
NEUTROPHILS # BLD AUTO: 6.2 X10E3/UL (ref 1.4–7)
NEUTROPHILS NFR BLD AUTO: 59 %
PLATELET # BLD AUTO: 377 X10E3/UL (ref 150–450)
POTASSIUM SERPL-SCNC: 3.3 MMOL/L (ref 3.5–5.2)
PREALB SERPL-MCNC: 21 MG/DL (ref 10–36)
PROT SERPL-MCNC: 7 G/DL (ref 6–8.5)
RBC # BLD AUTO: 4.19 X10E6/UL (ref 3.77–5.28)
SODIUM SERPL-SCNC: 142 MMOL/L (ref 134–144)
VIT B1 BLD-SCNC: 75.6 NMOL/L (ref 66.5–200)
WBC # BLD AUTO: 10.4 X10E3/UL (ref 3.4–10.8)

## 2019-10-16 ENCOUNTER — HOSPITAL ENCOUNTER (OUTPATIENT)
Dept: GENERAL RADIOLOGY | Facility: HOSPITAL | Age: 54
Discharge: HOME | End: 2019-10-16
Admitting: PHYSICIAN ASSISTANT

## 2019-10-16 DIAGNOSIS — R10.13 DYSPEPSIA: ICD-10-CM

## 2019-10-16 PROCEDURE — 74241: CPT

## 2019-10-16 RX ADMIN — BARIUM SULFATE 183 ML: 960 POWDER, FOR SUSPENSION ORAL at 08:33

## 2019-10-18 ENCOUNTER — TELEPHONE (OUTPATIENT)
Dept: BARIATRICS/WEIGHT MGMT | Facility: CLINIC | Age: 54
End: 2019-10-18

## 2019-10-18 NOTE — TELEPHONE ENCOUNTER
"----- Message from Jose Tyler MD sent at 10/17/2019  5:34 PM EDT -----    I reviewed the films.  Yes, EGD.  Has she had a colonoscopy?  Thanks!    ----- Message -----  From: Daisy Parsons PA  Sent: 10/17/2019   4:42 PM  To: Jose Tyler MD    Please review/advise:    54 y.o. female s/p LSG/HHR 8/2013 GDW.      Presents w/ postprandial N/V/reflux and epigastric pain/fullness x 3 weeks.  TUMS + pepto + GasX + ginger ale \"calm\" the symptoms a little bit.  Worries she may have a recurrent hiatal hernia.  Says has had issues w/ hoarseness x 2 months prior to most recent issues.  Say ENT @ [facility] most recently, scope pending.  Is not on a PPI.  Does not have any nausea meds but would like some.  Denies ill contacts.  Appetite has been decreased most recently.  Denies unintentional weight loss.  BMI 28.  Typically has daily bowel movements, but now bowel pattern alternating b/w diarrhea and constipation.  Takes NSAIDS prn, but not on a daily basis.  Denies steroid injections.  Denies tobacco use/exposure.   Not taking any vitamins.    Recent labs reveal low Vit D, low potassium, abnl kidney function (w/ hx renal insufficiency noted).  RX for Vit D, PPI, phenergan given.     UGI 10/16/19 @[facility] reveals moderate reflux and \"small outpouching of contrast seen at the proximal portion of the vertical sleeve. This outpouching appears to be intraluminal, and may represent a small gastric diverticulum, or possibly postsurgical changes.\"    Proceed w/ EGD?      ----- Message -----  From: Interface, Rad Results [place] In  Sent: 10/16/2019   9:40 PM  To: CATHY Villalpando          "

## 2019-10-18 NOTE — TELEPHONE ENCOUNTER
Notified pt that Dr. Tyler recommends an EGD to be scheduled for further evaluation of her most recent issues.  Asked pt if she is still having problems?  Pt stated that her symptoms are the same, no change. Pt stated she had her last Colonoscopy in 2018 at  and it was normal/showing no polyps. Sent fax request.  I also asked pt if she had her scope w/ ENT yet? Pt stated that this is not scheduled yet.

## 2019-11-04 ENCOUNTER — OUTSIDE FACILITY SERVICE (OUTPATIENT)
Dept: BARIATRICS/WEIGHT MGMT | Facility: CLINIC | Age: 54
End: 2019-11-04

## 2019-11-04 ENCOUNTER — LAB REQUISITION (OUTPATIENT)
Dept: LAB | Facility: HOSPITAL | Age: 54
End: 2019-11-04

## 2019-11-04 DIAGNOSIS — K30 FUNCTIONAL DYSPEPSIA: ICD-10-CM

## 2019-11-04 PROCEDURE — 88305 TISSUE EXAM BY PATHOLOGIST: CPT | Performed by: SURGERY

## 2019-11-04 PROCEDURE — 43239 EGD BIOPSY SINGLE/MULTIPLE: CPT | Performed by: SURGERY

## 2019-11-06 ENCOUNTER — TELEPHONE (OUTPATIENT)
Dept: BARIATRICS/WEIGHT MGMT | Facility: CLINIC | Age: 54
End: 2019-11-06

## 2019-11-06 NOTE — TELEPHONE ENCOUNTER
----- Message from Jose Tyler MD sent at 11/4/2019 10:52 AM EST -----    Please have her see me in the office to discuss options, ty!

## 2019-11-19 ENCOUNTER — CONSULT (OUTPATIENT)
Dept: BARIATRICS/WEIGHT MGMT | Facility: CLINIC | Age: 54
End: 2019-11-19

## 2019-11-19 VITALS
WEIGHT: 136.5 LBS | RESPIRATION RATE: 18 BRPM | HEIGHT: 58 IN | TEMPERATURE: 97.1 F | OXYGEN SATURATION: 99 % | HEART RATE: 70 BPM | SYSTOLIC BLOOD PRESSURE: 118 MMHG | DIASTOLIC BLOOD PRESSURE: 64 MMHG | BODY MASS INDEX: 28.65 KG/M2

## 2019-11-19 DIAGNOSIS — K44.9 HIATAL HERNIA: ICD-10-CM

## 2019-11-19 DIAGNOSIS — K31.1 GASTRIC OUTLET OBSTRUCTION: Primary | ICD-10-CM

## 2019-11-19 PROCEDURE — 99214 OFFICE O/P EST MOD 30 MIN: CPT | Performed by: SURGERY

## 2019-11-19 RX ORDER — CHLORHEXIDINE GLUCONATE 0.12 MG/ML
30 RINSE ORAL
Status: CANCELLED | OUTPATIENT
Start: 2019-11-19 | End: 2019-11-19

## 2019-11-19 RX ORDER — SODIUM CHLORIDE 0.9 % (FLUSH) 0.9 %
3-10 SYRINGE (ML) INJECTION AS NEEDED
Status: CANCELLED | OUTPATIENT
Start: 2019-11-19

## 2019-11-19 RX ORDER — SCOLOPAMINE TRANSDERMAL SYSTEM 1 MG/1
1 PATCH, EXTENDED RELEASE TRANSDERMAL ONCE
Status: CANCELLED | OUTPATIENT
Start: 2019-11-19 | End: 2019-11-19

## 2019-11-19 RX ORDER — SODIUM CHLORIDE, SODIUM LACTATE, POTASSIUM CHLORIDE, CALCIUM CHLORIDE 600; 310; 30; 20 MG/100ML; MG/100ML; MG/100ML; MG/100ML
150 INJECTION, SOLUTION INTRAVENOUS CONTINUOUS
Status: CANCELLED | OUTPATIENT
Start: 2019-11-19

## 2019-11-19 RX ORDER — GABAPENTIN 100 MG/1
600 CAPSULE ORAL ONCE
Status: CANCELLED | OUTPATIENT
Start: 2019-11-19 | End: 2019-11-19

## 2019-11-19 RX ORDER — ACETAMINOPHEN 500 MG
1000 TABLET ORAL ONCE
Status: CANCELLED | OUTPATIENT
Start: 2019-11-19 | End: 2019-11-19

## 2019-11-19 RX ORDER — SODIUM CHLORIDE 0.9 % (FLUSH) 0.9 %
3 SYRINGE (ML) INJECTION EVERY 12 HOURS SCHEDULED
Status: CANCELLED | OUTPATIENT
Start: 2019-11-19

## 2019-11-19 RX ORDER — PANTOPRAZOLE SODIUM 40 MG/10ML
40 INJECTION, POWDER, LYOPHILIZED, FOR SOLUTION INTRAVENOUS ONCE
Status: CANCELLED | OUTPATIENT
Start: 2019-11-19 | End: 2019-11-19

## 2019-11-20 PROBLEM — K31.1 GASTRIC OUTLET OBSTRUCTION: Status: ACTIVE | Noted: 2019-11-20

## 2019-11-20 PROBLEM — K44.9 HIATAL HERNIA: Status: ACTIVE | Noted: 2019-11-20

## 2019-11-24 NOTE — PROGRESS NOTES
"[facility] BARIATRIC SURGERY  [address]  [facility] [id number] [phone]      Patient  Name:  Iris Varma  :  1965      Date of Visit: 19    Chief Complaint: Recurrent hiatal hernia and reflux.    History of Present Illness:  Iris Varma is a 54 y.o. female who presents today for evaluation, education and consultation regarding her recurrent hiatal hernia and significant GE reflux symptoms.  Last seen by Daisy Parsons PA-C 10/9/2019.  From her evaluation that day:    \"Iris Varma is a 54 y.o. female s/p LSG/HHR 2013 GDW.      Presents w/ postprandial N/V/reflux and epigastric pain/fullness x 3 weeks.  TUMS + pepto + GasX + ginger ale \"calm\" the symptoms a little bit.  Worries she may have a recurrent hiatal hernia.  Says has had issues w/ hoarseness x 2 months prior to most recent issues.  Say ENT @  most recently, scope pending.  No other recent testing.  Is not on a PPI.  Does not have any nausea meds but would like some.  Appetite has been decreased most recently.  Denies unintentional weight loss.  Typically has daily bowel movements, but now bowel pattern alternating b/w diarrhea and constipation.      Takes NSAIDS prn, but not on a daily basis.  Denies steroid injections.  Denies tobacco use/exposure.   Not taking any vitamins.      Presurgery weight: 170 pounds. Today's weight is 62.4 kg (137 lb 8 oz) pounds, today's  Body mass index is 28.74 kg/m²., and her weight loss since surgery is 33 pounds.\"{    I did her EGD recently and had her follow-up in the office today to discuss options.  EGD on 2019 showed a shortened Z line at 35 cm, redundant cardia possibly causing a partial gastric outlet obstruction no visible gastritis.  At the time I did her laparoscopic cholecystectomy in 2018 her sleeve appeared to be resting nicely.  Endoscopy at that time showed a small amount of redundant cardia no recurrent hiatal hernia Z line 36 " cm.  Recent upper GI shows a small outpouching of the proximal sleeve which is felt to represent a small gastric diverticulum or postsurgical changes and moderate reflux to the level of the thoracic inlet.    On my review there appears to be redundant cardia and also a C-shaped twist of the sleeve with kinking at the angularis.  On most recent endoscopy the sleeve itself was a nice uniform tube down to the angularis which is widely patent without twist or narrowing the only abnormality being the redundant cardia.  Pathology of the antrum was negative for H. pylori and distal esophageal biopsies were suggestive of reflux.  She says another family member of hers is in the process of trying to have weight loss surgery with me.  She says she is very pleased with her sleeve results and no longer has diabetes and her blood pressure is now well regulated.  Since last seen 10/9/2019 she has lost 1 pound.      Past Medical History:   Diagnosis Date   • Acne     on Minocycline   • Allergic rhinitis    • Anxiety    • Anxiety    • Arthritis    • Chronic back pain    • Depression    • Diabetes mellitus (CMS/HCC)    • Dyspnea on exertion    • Eczema     prn steroid cream   • Elevated LFTs    • Fatigue    • GERD (gastroesophageal reflux disease)    • Helicobacter pylori (H. pylori) infection    • Hiatal hernia    • Hormone replacement therapy (HRT)     s/p TAHBSO   • Hyperlipidemia    • Hypertension    • Hypokalemia    • Insomnia    • Joint pain    • Obesity    • Peripheral edema     daily Maxzide   • Renal insufficiency    • Vitamin D deficiency      Past Surgical History:   Procedure Laterality Date   • BREAST BIOPSY Right 2012   •  SECTION  ,    • COLONOSCOPY     • GASTRIC SLEEVE LAPAROSCOPIC  2013    HHR   [facility]   • HYSTERECTOMY      w/ oophorectomy   • LAPAROSCOPIC CHOLECYSTECTOMY  2018    w/ PAIGE by Dr. Tyler   • TONSILLECTOMY         Allergies   Allergen Reactions   • Lisinopril Swelling      Lips       Current Outpatient Medications:   •  carvedilol (COREG) 12.5 MG tablet, Take 12.5 mg by mouth 2 (Two) Times a Day With Meals., Disp: , Rfl:   •  Cholecalciferol (VITAMIN D3) 25413 units tablet, Take 1 tablet by mouth 1 (One) Time Per Week for 12 doses., Disp: 12 tablet, Rfl: 0  •  escitalopram (LEXAPRO) 20 MG tablet, Take 20 mg by mouth Daily., Disp: , Rfl:   •  estradiol (MINIVELLE, VIVELLE-DOT) 0.1 MG/24HR patch, , Disp: , Rfl:   •  minocycline (MINOCIN,DYNACIN) 100 MG capsule, Take 100 mg by mouth Daily., Disp: , Rfl:   •  omeprazole (priLOSEC) 40 MG capsule, Take 1 capsule by mouth Daily., Disp: 30 capsule, Rfl: 0  •  promethazine (PHENERGAN) 25 MG tablet, Take 0.5 tablets by mouth Every 6 (Six) Hours As Needed for Nausea., Disp: 30 tablet, Rfl: 0  •  triamcinolone (KENALOG) 0.1 % ointment, Apply  topically to the appropriate area as directed As Needed., Disp: , Rfl:   •  triamterene-hydrochlorothiazide (DYAZIDE) 37.5-25 MG per capsule, Take 1 capsule by mouth Daily., Disp: , Rfl:     Social History     Socioeconomic History   • Marital status:      Spouse name: Not on file   • Number of children: Not on file   • Years of education: Not on file   • Highest education level: Not on file   Tobacco Use   • Smoking status: Never Smoker   • Smokeless tobacco: Never Used   Substance and Sexual Activity   • Alcohol use: No   • Drug use: No     Family History   Problem Relation Age of Onset   • Cancer Mother    • Diabetes Mother    • Hypertension Mother    • Stroke Mother    • Hypertension Sister    • Hypertension Brother    • Diabetes Maternal Grandmother    • Hypertension Maternal Grandfather    • Stroke Maternal Grandfather    • Cancer Paternal Grandmother        Review of Systems   Constitutional: Positive for fatigue. Negative for chills, diaphoresis, fever and unexpected weight loss.   HENT: Negative for congestion and facial swelling.    Eyes: Negative for blurred vision, double vision and  discharge.   Respiratory: Negative for chest tightness, shortness of breath and stridor.    Cardiovascular: Negative for chest pain, palpitations and leg swelling.   Gastrointestinal: Positive for GERD. Negative for blood in stool.   Endocrine: Negative for polydipsia.   Genitourinary: Negative for hematuria.   Musculoskeletal: Positive for arthralgias.   Skin: Negative for color change.   Allergic/Immunologic: Negative for immunocompromised state.   Neurological: Negative for confusion.   Psychiatric/Behavioral: Negative for self-injury.       I have reviewed the ROS and confirm that it's accurate today.    Physical Exam:  Vital Signs:  Weight: 61.9 kg (136 lb 8 oz)   Body mass index is 28.53 kg/m².  Temp: 97.1 °F (36.2 °C)   Heart Rate: 70   BP: 118/64     Physical Exam   Constitutional: She is oriented to person, place, and time. She appears well-developed and well-nourished.   HENT:   Head: Normocephalic and atraumatic.   Nose: Nose normal.   Eyes: Conjunctivae and EOM are normal. Pupils are equal, round, and reactive to light.   Neck: Normal range of motion. Neck supple. Carotid bruit is not present. No tracheal deviation present. No thyromegaly present.   Cardiovascular: Normal rate, regular rhythm and normal heart sounds.   Pulmonary/Chest: Effort normal and breath sounds normal. No respiratory distress.   Abdominal: Soft. She exhibits no distension. There is no hepatosplenomegaly. There is no tenderness.   Laparoscopy scars and low transverse scar.   Musculoskeletal: Normal range of motion. She exhibits no edema or deformity.   Neurological: She is alert and oriented to person, place, and time. No cranial nerve deficit. Coordination normal.   Skin: Skin is warm and dry. No rash noted.   Psychiatric: She has a normal mood and affect. Her behavior is normal. Judgment and thought content normal.   Vitals reviewed.      Patient Active Problem List   Diagnosis   • Anxiety   • Acne   • Eczema   • Hormone  replacement therapy (HRT)   • Peripheral edema   • Vitamin D deficiency   • Gastric outlet obstruction   • Hiatal hernia       Assessment:    Iris Varma is a 54 y.o. year old female with recurrent significant reflux and possible recurrent hiatal hernia, possible partial gastric outlet obstruction status post laparoscopic sleeve gastrectomy and hiatal hernia repair in August 2013.      Plan: I went over the risks, benefits, and alternative therapies at length with diagrams and she wishes to proceed with laparoscopic recurrent hiatal hernia repair and if endoscopically the partial gastric outlet obstruction is still present,  Laparoscopic proximal gastrojejunostomy to the sleeve above the partial GOO.  Also after discussion of the risks benefits alternative therapies she is not interested in conversion to a formal Diogenes-en-Y gastric bypass at this time and understands that this would be the most common recommendation given her symptoms and presentation.  I encouraged her to seek second opinion(s), but says she is not interested at this time.          Jose Tyler MD

## 2019-12-12 ENCOUNTER — APPOINTMENT (OUTPATIENT)
Dept: PREADMISSION TESTING | Facility: HOSPITAL | Age: 54
End: 2019-12-12

## 2020-01-08 ENCOUNTER — OFFICE VISIT (OUTPATIENT)
Dept: BARIATRICS/WEIGHT MGMT | Facility: CLINIC | Age: 55
End: 2020-01-08

## 2020-01-08 ENCOUNTER — APPOINTMENT (OUTPATIENT)
Dept: PREADMISSION TESTING | Facility: HOSPITAL | Age: 55
End: 2020-01-08

## 2020-01-08 VITALS
OXYGEN SATURATION: 100 % | RESPIRATION RATE: 18 BRPM | TEMPERATURE: 96.3 F | WEIGHT: 136 LBS | DIASTOLIC BLOOD PRESSURE: 72 MMHG | SYSTOLIC BLOOD PRESSURE: 126 MMHG | BODY MASS INDEX: 28.55 KG/M2 | HEIGHT: 58 IN | HEART RATE: 68 BPM

## 2020-01-08 DIAGNOSIS — Z01.89 LABORATORY TEST: Primary | ICD-10-CM

## 2020-01-08 DIAGNOSIS — K21.9 GASTROESOPHAGEAL REFLUX DISEASE, ESOPHAGITIS PRESENCE NOT SPECIFIED: Primary | ICD-10-CM

## 2020-01-08 DIAGNOSIS — K31.1 GASTRIC OUTLET OBSTRUCTION: ICD-10-CM

## 2020-01-08 DIAGNOSIS — K44.9 HIATAL HERNIA: ICD-10-CM

## 2020-01-08 LAB
ABO GROUP BLD: NORMAL
DEPRECATED RDW RBC AUTO: 44.5 FL (ref 37–54)
ERYTHROCYTE [DISTWIDTH] IN BLOOD BY AUTOMATED COUNT: 14.5 % (ref 12.3–15.4)
HBA1C MFR BLD: 5.9 % (ref 4.8–5.6)
HCT VFR BLD AUTO: 36.4 % (ref 34–46.6)
HGB BLD-MCNC: 12.4 G/DL (ref 12–15.9)
MCH RBC QN AUTO: 29 PG (ref 26.6–33)
MCHC RBC AUTO-ENTMCNC: 34.1 G/DL (ref 31.5–35.7)
MCV RBC AUTO: 85.2 FL (ref 79–97)
PLATELET # BLD AUTO: 349 10*3/MM3 (ref 140–450)
PMV BLD AUTO: 10.8 FL (ref 6–12)
POTASSIUM BLD-SCNC: 3.4 MMOL/L (ref 3.5–5.2)
RBC # BLD AUTO: 4.27 10*6/MM3 (ref 3.77–5.28)
RH BLD: POSITIVE
WBC NRBC COR # BLD: 9.66 10*3/MM3 (ref 3.4–10.8)

## 2020-01-08 PROCEDURE — 93005 ELECTROCARDIOGRAM TRACING: CPT

## 2020-01-08 PROCEDURE — 99214 OFFICE O/P EST MOD 30 MIN: CPT | Performed by: PHYSICIAN ASSISTANT

## 2020-01-08 PROCEDURE — 93010 ELECTROCARDIOGRAM REPORT: CPT | Performed by: INTERNAL MEDICINE

## 2020-01-08 PROCEDURE — 84132 ASSAY OF SERUM POTASSIUM: CPT | Performed by: ANESTHESIOLOGY

## 2020-01-08 PROCEDURE — 36415 COLL VENOUS BLD VENIPUNCTURE: CPT

## 2020-01-08 PROCEDURE — 83036 HEMOGLOBIN GLYCOSYLATED A1C: CPT | Performed by: ANESTHESIOLOGY

## 2020-01-08 PROCEDURE — 85027 COMPLETE CBC AUTOMATED: CPT | Performed by: ANESTHESIOLOGY

## 2020-01-08 PROCEDURE — 86901 BLOOD TYPING SEROLOGIC RH(D): CPT

## 2020-01-08 PROCEDURE — 86900 BLOOD TYPING SEROLOGIC ABO: CPT

## 2020-01-08 NOTE — PAT
Patient to apply Chlorhexadine wipes  to surgical area (as instructed) the night before procedure and the AM of procedure. Wipes provided.    Patient instructed to drink 20 ounces (or until full) of Gatorade and it needs to be completed 1 hour before given arrival time for procedure (NO RED Gatorade)    Patient verbalized understanding.    Blood bank bracelet applied to patient during Pre Admission Testing visit.  Patient instructed not to remove from arm until after procedure and they are discharged from the hospital.  Explained to patient that they may shower and get the bracelet wet, but not to immerse under water for longer periods (bathing, swimming, hand dishwashing, etc).  Patient verbalized understanding.

## 2020-01-09 ENCOUNTER — ANESTHESIA EVENT (OUTPATIENT)
Dept: PERIOP | Facility: HOSPITAL | Age: 55
End: 2020-01-09

## 2020-01-09 RX ORDER — FAMOTIDINE 20 MG/1
20 TABLET, FILM COATED ORAL ONCE
Status: CANCELLED | OUTPATIENT
Start: 2020-01-09 | End: 2020-01-09

## 2020-01-09 RX ORDER — FAMOTIDINE 10 MG/ML
20 INJECTION, SOLUTION INTRAVENOUS ONCE
Status: CANCELLED | OUTPATIENT
Start: 2020-01-09 | End: 2020-01-09

## 2020-01-09 NOTE — H&P (VIEW-ONLY)
"[facility]  [address]  [address]  [facility] [address] [phone]        Patient Name: Iris Varma.  YOB: 1965      Date of Visit: 01/08/2020      CC:  uncontrolled reflux, recurrent HH, possible GOO    HPI:  54 y.o. female s/p LSG/HHR 8/2013 GDW    Presented to office 10/2019 c/o postprandial N/V/reflux w/ associated epigastric pain/fullness, persistent/worsening hoarseness of unclear etiology.      Eval included:  UGI 10/16/19 @[facility] revealing moderate reflux and \"small outpouching of contrast seen at the proximal portion of the vertical sleeve. This outpouching appears to be intraluminal, and may represent a small gastric diverticulum, or possibly postsurgical changes.\"  EGD 11/4/19 w/ Dr. Tyler revealing partial outlet obstruction.    Saw Dr. Tyler in consultation 11/19/19, from that eval: \"I did her EGD recently and had her follow-up in the office today to discuss options.  EGD on 11/4/2019 showed a shortened Z line at 35 cm, redundant cardia possibly causing a partial gastric outlet obstruction no visible gastritis.  At the time I did her laparoscopic cholecystectomy in September 2018 her sleeve appeared to be resting nicely.  Endoscopy at that time showed a small amount of redundant cardia no recurrent hiatal hernia Z line 36 cm.  Recent upper GI shows a small outpouching of the proximal sleeve which is felt to represent a small gastric diverticulum or postsurgical changes and moderate reflux to the level of the thoracic inlet.    On my review there appears to be redundant cardia and also a C-shaped twist of the sleeve with kinking at the angularis.  On most recent endoscopy the sleeve itself was a nice uniform tube down to the angularis which is widely patent without twist or narrowing the only abnormality being the redundant cardia.  Pathology of the antrum was negative for H. pylori and distal esophageal biopsies were suggestive of reflux.  " "She says another family member of hers is in the process of trying to have weight loss surgery with me.  She says she is very pleased with her sleeve results and no longer has diabetes and her blood pressure is now well regulated.\"    Past Medical History:   Diagnosis Date   • Acne     on Minocycline   • Allergic rhinitis    • Anxiety    • Anxiety    • Arthritis    • Chronic back pain    • Depression    • Diabetes mellitus (CMS/HCC)    • Dyspnea on exertion    • Eczema     prn steroid cream   • Elevated LFTs    • Fatigue    • GERD (gastroesophageal reflux disease)    • Helicobacter pylori (H. pylori) infection    • Hiatal hernia    • Hormone replacement therapy (HRT)     s/p TAHBSO   • Hyperlipidemia    • Hypertension    • Hypokalemia    • Insomnia    • Joint pain    • Obesity    • Peripheral edema     daily Maxzide   • Renal insufficiency    • Vitamin D deficiency    • Wears glasses      Past Surgical History:   Procedure Laterality Date   • BREAST BIOPSY Right    •  SECTION  ,    • COLONOSCOPY     • GASTRIC SLEEVE LAPAROSCOPIC  2013    HHR   GDW   • HYSTERECTOMY      w/ oophorectomy   • LAPAROSCOPIC CHOLECYSTECTOMY  2018    w/ PAIGE by Dr. Tyler   • TONSILLECTOMY           Current Outpatient Medications:   •  carvedilol (COREG) 12.5 MG tablet, Take 12.5 mg by mouth 2 (Two) Times a Day With Meals., Disp: , Rfl:   •  escitalopram (LEXAPRO) 20 MG tablet, Take 20 mg by mouth Daily., Disp: , Rfl:   •  estradiol (MINIVELLE, VIVELLE-DOT) 0.1 MG/24HR patch, Place 1 patch on the skin as directed by provider 2 (Two) Times a Week., Disp: , Rfl:   •  minocycline (MINOCIN,DYNACIN) 100 MG capsule, Take 100 mg by mouth Daily., Disp: , Rfl:   •  triamcinolone (KENALOG) 0.1 % ointment, Apply  topically to the appropriate area as directed As Needed., Disp: , Rfl:   •  triamterene-hydrochlorothiazide (DYAZIDE) 37.5-25 MG per capsule, Take 1 capsule by mouth Daily., Disp: , Rfl:   •  omeprazole " (priLOSEC) 40 MG capsule, Take 1 capsule by mouth Daily., Disp: 30 capsule, Rfl: 0  •  promethazine (PHENERGAN) 25 MG tablet, Take 0.5 tablets by mouth Every 6 (Six) Hours As Needed for Nausea., Disp: 30 tablet, Rfl: 0    Allergies   Allergen Reactions   • Lisinopril Swelling     Lips       Family History   Problem Relation Age of Onset   • Cancer Mother    • Diabetes Mother    • Hypertension Mother    • Stroke Mother    • Hypertension Sister    • Hypertension Brother    • Diabetes Maternal Grandmother    • Hypertension Maternal Grandfather    • Stroke Maternal Grandfather    • Cancer Paternal Grandmother      Social History     Socioeconomic History   • Marital status:      Spouse name: Not on file   • Number of children: Not on file   • Years of education: Not on file   • Highest education level: Not on file   Tobacco Use   • Smoking status: Never Smoker   • Smokeless tobacco: Never Used   Substance and Sexual Activity   • Alcohol use: No   • Drug use: No   • Sexual activity: Defer     Review of Systems   Constitutional: Positive for fatigue. Negative for chills, diaphoresis, fever and unexpected weight loss.   HENT: Negative for congestion and facial swelling.    Eyes: Negative for blurred vision, double vision and discharge.   Respiratory: Negative for chest tightness, shortness of breath and stridor.    Cardiovascular: Negative for chest pain, palpitations and leg swelling.   Gastrointestinal: Positive for GERD. Negative for blood in stool.   Endocrine: Negative for polydipsia.   Genitourinary: Negative for hematuria.   Musculoskeletal: Positive for arthralgias.   Skin: Negative for color change.   Allergic/Immunologic: Negative for immunocompromised state.   Neurological: Negative for confusion.   Psychiatric/Behavioral: Negative for self-injury.     Reviewed today and is without changes.    /72 (BP Location: Left arm, Patient Position: Sitting, Cuff Size: Adult)   Pulse 68   Temp 96.3 °F (35.7  "°C) (Temporal)   Resp 18   Ht 147.3 cm (58\")   Wt 61.7 kg (136 lb)   SpO2 100%   BMI 28.42 kg/m²     Physical Exam   Constitutional: She appears well-developed and well-nourished. She is cooperative.   HENT:   Mouth/Throat: Oropharynx is clear and moist and mucous membranes are normal.   Eyes: Conjunctivae are normal. No scleral icterus.   Cardiovascular: Normal rate.   Pulmonary/Chest: Effort normal.   Abdominal: Soft. There is no tenderness.   Musculoskeletal: Normal range of motion. She exhibits no edema.   Neurological: She is alert.   Skin: Skin is warm and dry. No rash noted.   Psychiatric: She has a normal mood and affect. Judgment normal.         Assessment:   54 y.o. female s/p LSG/HHR 8/2013 GDW    ICD-10-CM ICD-9-CM   1. Gastroesophageal reflux disease, esophagitis presence not specified K21.9 530.81   2. Hiatal hernia K44.9 553.3   3. Gastric outlet obstruction K31.1 537.0     From Dr. Tyler's evaluation 11/19/19:  \"54 y.o. year old female with recurrent significant reflux and possible recurrent hiatal hernia, possible partial gastric outlet obstruction status post laparoscopic sleeve gastrectomy and hiatal hernia repair in August 2013.  I went over the risks, benefits, and alternative therapies at length with diagrams and she wishes to proceed with laparoscopic recurrent hiatal hernia repair and if endoscopically the partial gastric outlet obstruction is still present,  Laparoscopic proximal gastrojejunostomy to the sleeve above the partial GOO.  Also after discussion of the risks benefits alternative therapies she is not interested in conversion to a formal Diogenes-en-Y gastric bypass at this time and understands that this would be the most common recommendation given her symptoms and presentation.  I encouraged her to seek second opinion(s), but says she is not interested at this time.      Plan:  Laparoscopic Recurrent Hiatal Hernia Repair, EGD, possible Laparoscopic Gastrojejunostomy.  Reviewed " surgical plan.  All questions answered.      CATHY Villalpando

## 2020-01-09 NOTE — PROGRESS NOTES
"[facility]  [address]  [address]  [facility] [address] [phone]        Patient Name: Iris Varma.  YOB: 1965      Date of Visit: 01/08/2020      CC:  uncontrolled reflux, recurrent HH, possible GOO    HPI:  54 y.o. female s/p LSG/HHR 8/2013 GDW    Presented to office 10/2019 c/o postprandial N/V/reflux w/ associated epigastric pain/fullness, persistent/worsening hoarseness of unclear etiology.      Eval included:  UGI 10/16/19 @[facility] revealing moderate reflux and \"small outpouching of contrast seen at the proximal portion of the vertical sleeve. This outpouching appears to be intraluminal, and may represent a small gastric diverticulum, or possibly postsurgical changes.\"  EGD 11/4/19 w/ Dr. Tyler revealing partial outlet obstruction.    Saw Dr. Tyler in consultation 11/19/19, from that eval: \"I did her EGD recently and had her follow-up in the office today to discuss options.  EGD on 11/4/2019 showed a shortened Z line at 35 cm, redundant cardia possibly causing a partial gastric outlet obstruction no visible gastritis.  At the time I did her laparoscopic cholecystectomy in September 2018 her sleeve appeared to be resting nicely.  Endoscopy at that time showed a small amount of redundant cardia no recurrent hiatal hernia Z line 36 cm.  Recent upper GI shows a small outpouching of the proximal sleeve which is felt to represent a small gastric diverticulum or postsurgical changes and moderate reflux to the level of the thoracic inlet.    On my review there appears to be redundant cardia and also a C-shaped twist of the sleeve with kinking at the angularis.  On most recent endoscopy the sleeve itself was a nice uniform tube down to the angularis which is widely patent without twist or narrowing the only abnormality being the redundant cardia.  Pathology of the antrum was negative for H. pylori and distal esophageal biopsies were suggestive of reflux.  " "She says another family member of hers is in the process of trying to have weight loss surgery with me.  She says she is very pleased with her sleeve results and no longer has diabetes and her blood pressure is now well regulated.\"    Past Medical History:   Diagnosis Date   • Acne     on Minocycline   • Allergic rhinitis    • Anxiety    • Anxiety    • Arthritis    • Chronic back pain    • Depression    • Diabetes mellitus (CMS/HCC)    • Dyspnea on exertion    • Eczema     prn steroid cream   • Elevated LFTs    • Fatigue    • GERD (gastroesophageal reflux disease)    • Helicobacter pylori (H. pylori) infection    • Hiatal hernia    • Hormone replacement therapy (HRT)     s/p TAHBSO   • Hyperlipidemia    • Hypertension    • Hypokalemia    • Insomnia    • Joint pain    • Obesity    • Peripheral edema     daily Maxzide   • Renal insufficiency    • Vitamin D deficiency    • Wears glasses      Past Surgical History:   Procedure Laterality Date   • BREAST BIOPSY Right    •  SECTION  ,    • COLONOSCOPY     • GASTRIC SLEEVE LAPAROSCOPIC  2013    HHR   GDW   • HYSTERECTOMY      w/ oophorectomy   • LAPAROSCOPIC CHOLECYSTECTOMY  2018    w/ PAIGE by Dr. Tyler   • TONSILLECTOMY           Current Outpatient Medications:   •  carvedilol (COREG) 12.5 MG tablet, Take 12.5 mg by mouth 2 (Two) Times a Day With Meals., Disp: , Rfl:   •  escitalopram (LEXAPRO) 20 MG tablet, Take 20 mg by mouth Daily., Disp: , Rfl:   •  estradiol (MINIVELLE, VIVELLE-DOT) 0.1 MG/24HR patch, Place 1 patch on the skin as directed by provider 2 (Two) Times a Week., Disp: , Rfl:   •  minocycline (MINOCIN,DYNACIN) 100 MG capsule, Take 100 mg by mouth Daily., Disp: , Rfl:   •  triamcinolone (KENALOG) 0.1 % ointment, Apply  topically to the appropriate area as directed As Needed., Disp: , Rfl:   •  triamterene-hydrochlorothiazide (DYAZIDE) 37.5-25 MG per capsule, Take 1 capsule by mouth Daily., Disp: , Rfl:   •  omeprazole " (priLOSEC) 40 MG capsule, Take 1 capsule by mouth Daily., Disp: 30 capsule, Rfl: 0  •  promethazine (PHENERGAN) 25 MG tablet, Take 0.5 tablets by mouth Every 6 (Six) Hours As Needed for Nausea., Disp: 30 tablet, Rfl: 0    Allergies   Allergen Reactions   • Lisinopril Swelling     Lips       Family History   Problem Relation Age of Onset   • Cancer Mother    • Diabetes Mother    • Hypertension Mother    • Stroke Mother    • Hypertension Sister    • Hypertension Brother    • Diabetes Maternal Grandmother    • Hypertension Maternal Grandfather    • Stroke Maternal Grandfather    • Cancer Paternal Grandmother      Social History     Socioeconomic History   • Marital status:      Spouse name: Not on file   • Number of children: Not on file   • Years of education: Not on file   • Highest education level: Not on file   Tobacco Use   • Smoking status: Never Smoker   • Smokeless tobacco: Never Used   Substance and Sexual Activity   • Alcohol use: No   • Drug use: No   • Sexual activity: Defer     Review of Systems   Constitutional: Positive for fatigue. Negative for chills, diaphoresis, fever and unexpected weight loss.   HENT: Negative for congestion and facial swelling.    Eyes: Negative for blurred vision, double vision and discharge.   Respiratory: Negative for chest tightness, shortness of breath and stridor.    Cardiovascular: Negative for chest pain, palpitations and leg swelling.   Gastrointestinal: Positive for GERD. Negative for blood in stool.   Endocrine: Negative for polydipsia.   Genitourinary: Negative for hematuria.   Musculoskeletal: Positive for arthralgias.   Skin: Negative for color change.   Allergic/Immunologic: Negative for immunocompromised state.   Neurological: Negative for confusion.   Psychiatric/Behavioral: Negative for self-injury.     Reviewed today and is without changes.    /72 (BP Location: Left arm, Patient Position: Sitting, Cuff Size: Adult)   Pulse 68   Temp 96.3 °F (35.7  "°C) (Temporal)   Resp 18   Ht 147.3 cm (58\")   Wt 61.7 kg (136 lb)   SpO2 100%   BMI 28.42 kg/m²     Physical Exam   Constitutional: She appears well-developed and well-nourished. She is cooperative.   HENT:   Mouth/Throat: Oropharynx is clear and moist and mucous membranes are normal.   Eyes: Conjunctivae are normal. No scleral icterus.   Cardiovascular: Normal rate.   Pulmonary/Chest: Effort normal.   Abdominal: Soft. There is no tenderness.   Musculoskeletal: Normal range of motion. She exhibits no edema.   Neurological: She is alert.   Skin: Skin is warm and dry. No rash noted.   Psychiatric: She has a normal mood and affect. Judgment normal.         Assessment:   54 y.o. female s/p LSG/HHR 8/2013 GDW    ICD-10-CM ICD-9-CM   1. Gastroesophageal reflux disease, esophagitis presence not specified K21.9 530.81   2. Hiatal hernia K44.9 553.3   3. Gastric outlet obstruction K31.1 537.0     From Dr. Tyler's evaluation 11/19/19:  \"54 y.o. year old female with recurrent significant reflux and possible recurrent hiatal hernia, possible partial gastric outlet obstruction status post laparoscopic sleeve gastrectomy and hiatal hernia repair in August 2013.  I went over the risks, benefits, and alternative therapies at length with diagrams and she wishes to proceed with laparoscopic recurrent hiatal hernia repair and if endoscopically the partial gastric outlet obstruction is still present,  Laparoscopic proximal gastrojejunostomy to the sleeve above the partial GOO.  Also after discussion of the risks benefits alternative therapies she is not interested in conversion to a formal Diogenes-en-Y gastric bypass at this time and understands that this would be the most common recommendation given her symptoms and presentation.  I encouraged her to seek second opinion(s), but says she is not interested at this time.      Plan:  Laparoscopic Recurrent Hiatal Hernia Repair, EGD, possible Laparoscopic Gastrojejunostomy.  Reviewed " surgical plan.  All questions answered.      CATHY Villalpando

## 2020-01-10 ENCOUNTER — HOSPITAL ENCOUNTER (INPATIENT)
Facility: HOSPITAL | Age: 55
LOS: 1 days | Discharge: HOME | End: 2020-01-11
Attending: SURGERY | Admitting: SURGERY

## 2020-01-10 ENCOUNTER — ANESTHESIA (OUTPATIENT)
Dept: PERIOP | Facility: HOSPITAL | Age: 55
End: 2020-01-10

## 2020-01-10 DIAGNOSIS — K31.1 GASTRIC OUTLET OBSTRUCTION: ICD-10-CM

## 2020-01-10 DIAGNOSIS — K44.9 HIATAL HERNIA: ICD-10-CM

## 2020-01-10 LAB
ABO GROUP BLD: NORMAL
BLD GP AB SCN SERPL QL: NEGATIVE
GLUCOSE BLDC GLUCOMTR-MCNC: 164 MG/DL (ref 70–130)
RH BLD: POSITIVE
T&S EXPIRATION DATE: NORMAL

## 2020-01-10 PROCEDURE — 86850 RBC ANTIBODY SCREEN: CPT | Performed by: SURGERY

## 2020-01-10 PROCEDURE — 25010000002 ONDANSETRON PER 1 MG: Performed by: NURSE ANESTHETIST, CERTIFIED REGISTERED

## 2020-01-10 PROCEDURE — 25010000002 NEOSTIGMINE 10 MG/10ML SOLUTION: Performed by: NURSE ANESTHETIST, CERTIFIED REGISTERED

## 2020-01-10 PROCEDURE — 86901 BLOOD TYPING SEROLOGIC RH(D): CPT | Performed by: SURGERY

## 2020-01-10 PROCEDURE — 25010000002 ONDANSETRON PER 1 MG: Performed by: SURGERY

## 2020-01-10 PROCEDURE — 82962 GLUCOSE BLOOD TEST: CPT

## 2020-01-10 PROCEDURE — 86900 BLOOD TYPING SEROLOGIC ABO: CPT | Performed by: SURGERY

## 2020-01-10 PROCEDURE — 25010000002 DEXAMETHASONE SODIUM PHOSPHATE 10 MG/ML SOLUTION: Performed by: NURSE ANESTHETIST, CERTIFIED REGISTERED

## 2020-01-10 PROCEDURE — 88307 TISSUE EXAM BY PATHOLOGIST: CPT | Performed by: SURGERY

## 2020-01-10 PROCEDURE — 25010000002 BUPRENORPHINE PER 0.1 MG: Performed by: NURSE ANESTHETIST, CERTIFIED REGISTERED

## 2020-01-10 PROCEDURE — 0DB64ZZ EXCISION OF STOMACH, PERCUTANEOUS ENDOSCOPIC APPROACH: ICD-10-PCS | Performed by: SURGERY

## 2020-01-10 PROCEDURE — 25010000002 ENOXAPARIN PER 10 MG: Performed by: SURGERY

## 2020-01-10 PROCEDURE — 0BQT4ZZ REPAIR DIAPHRAGM, PERCUTANEOUS ENDOSCOPIC APPROACH: ICD-10-PCS | Performed by: SURGERY

## 2020-01-10 PROCEDURE — 43659 UNLISTED LAPS PX STOMACH: CPT | Performed by: SURGERY

## 2020-01-10 PROCEDURE — 25010000003 CEFAZOLIN IN DEXTROSE 2-4 GM/100ML-% SOLUTION: Performed by: SURGERY

## 2020-01-10 PROCEDURE — 25010000002 DEXAMETHASONE PER 1 MG: Performed by: NURSE ANESTHETIST, CERTIFIED REGISTERED

## 2020-01-10 PROCEDURE — 25010000002 SUCCINYLCHOLINE PER 20 MG: Performed by: NURSE ANESTHETIST, CERTIFIED REGISTERED

## 2020-01-10 PROCEDURE — 94799 UNLISTED PULMONARY SVC/PX: CPT

## 2020-01-10 PROCEDURE — 25010000002 PROPOFOL 10 MG/ML EMULSION: Performed by: NURSE ANESTHETIST, CERTIFIED REGISTERED

## 2020-01-10 DEVICE — BLACK REINFORCED INTELLIGENT RELOAD, FOR USE WITH SIGNIA STAPLING SYSTEM
Type: IMPLANTABLE DEVICE | Site: STOMACH | Status: FUNCTIONAL
Brand: TRI-STAPLE 2.0

## 2020-01-10 RX ORDER — HYDROMORPHONE HYDROCHLORIDE 1 MG/ML
0.5 INJECTION, SOLUTION INTRAMUSCULAR; INTRAVENOUS; SUBCUTANEOUS
Status: DISCONTINUED | OUTPATIENT
Start: 2020-01-10 | End: 2020-01-10 | Stop reason: HOSPADM

## 2020-01-10 RX ORDER — CARVEDILOL 12.5 MG/1
12.5 TABLET ORAL 2 TIMES DAILY WITH MEALS
Status: DISCONTINUED | OUTPATIENT
Start: 2020-01-10 | End: 2020-01-11 | Stop reason: HOSPADM

## 2020-01-10 RX ORDER — SODIUM CHLORIDE, SODIUM LACTATE, POTASSIUM CHLORIDE, CALCIUM CHLORIDE 600; 310; 30; 20 MG/100ML; MG/100ML; MG/100ML; MG/100ML
150 INJECTION, SOLUTION INTRAVENOUS CONTINUOUS
Status: DISCONTINUED | OUTPATIENT
Start: 2020-01-10 | End: 2020-01-11 | Stop reason: HOSPADM

## 2020-01-10 RX ORDER — SUCCINYLCHOLINE CHLORIDE 20 MG/ML
INJECTION INTRAMUSCULAR; INTRAVENOUS AS NEEDED
Status: DISCONTINUED | OUTPATIENT
Start: 2020-01-10 | End: 2020-01-10 | Stop reason: SURG

## 2020-01-10 RX ORDER — ROCURONIUM BROMIDE 10 MG/ML
INJECTION, SOLUTION INTRAVENOUS AS NEEDED
Status: DISCONTINUED | OUTPATIENT
Start: 2020-01-10 | End: 2020-01-10 | Stop reason: SURG

## 2020-01-10 RX ORDER — MINOCYCLINE HYDROCHLORIDE 50 MG/1
100 CAPSULE ORAL DAILY
Status: DISCONTINUED | OUTPATIENT
Start: 2020-01-10 | End: 2020-01-11 | Stop reason: HOSPADM

## 2020-01-10 RX ORDER — DEXAMETHASONE SODIUM PHOSPHATE 4 MG/ML
8 INJECTION, SOLUTION INTRA-ARTICULAR; INTRALESIONAL; INTRAMUSCULAR; INTRAVENOUS; SOFT TISSUE ONCE AS NEEDED
Status: DISCONTINUED | OUTPATIENT
Start: 2020-01-10 | End: 2020-01-10 | Stop reason: HOSPADM

## 2020-01-10 RX ORDER — PROMETHAZINE HYDROCHLORIDE 12.5 MG/1
12.5 TABLET ORAL EVERY 6 HOURS PRN
Status: DISCONTINUED | OUTPATIENT
Start: 2020-01-10 | End: 2020-01-11 | Stop reason: HOSPADM

## 2020-01-10 RX ORDER — SCOLOPAMINE TRANSDERMAL SYSTEM 1 MG/1
1 PATCH, EXTENDED RELEASE TRANSDERMAL ONCE
Status: DISCONTINUED | OUTPATIENT
Start: 2020-01-10 | End: 2020-01-10

## 2020-01-10 RX ORDER — PANTOPRAZOLE SODIUM 40 MG/10ML
40 INJECTION, POWDER, LYOPHILIZED, FOR SOLUTION INTRAVENOUS
Status: DISCONTINUED | OUTPATIENT
Start: 2020-01-11 | End: 2020-01-11 | Stop reason: HOSPADM

## 2020-01-10 RX ORDER — LIDOCAINE HYDROCHLORIDE 10 MG/ML
0.5 INJECTION, SOLUTION EPIDURAL; INFILTRATION; INTRACAUDAL; PERINEURAL ONCE AS NEEDED
Status: COMPLETED | OUTPATIENT
Start: 2020-01-10 | End: 2020-01-10

## 2020-01-10 RX ORDER — BUPIVACAINE HYDROCHLORIDE 2.5 MG/ML
INJECTION, SOLUTION EPIDURAL; INFILTRATION; INTRACAUDAL
Status: COMPLETED | OUTPATIENT
Start: 2020-01-10 | End: 2020-01-10

## 2020-01-10 RX ORDER — NEOSTIGMINE METHYLSULFATE 1 MG/ML
INJECTION, SOLUTION INTRAVENOUS AS NEEDED
Status: DISCONTINUED | OUTPATIENT
Start: 2020-01-10 | End: 2020-01-10 | Stop reason: SURG

## 2020-01-10 RX ORDER — LORAZEPAM 1 MG/1
1 TABLET ORAL EVERY 12 HOURS PRN
Status: DISCONTINUED | OUTPATIENT
Start: 2020-01-10 | End: 2020-01-11 | Stop reason: HOSPADM

## 2020-01-10 RX ORDER — ATRACURIUM BESYLATE 10 MG/ML
INJECTION, SOLUTION INTRAVENOUS AS NEEDED
Status: DISCONTINUED | OUTPATIENT
Start: 2020-01-10 | End: 2020-01-10 | Stop reason: SURG

## 2020-01-10 RX ORDER — ALBUTEROL SULFATE 2.5 MG/3ML
2.5 SOLUTION RESPIRATORY (INHALATION) EVERY 4 HOURS PRN
Status: DISCONTINUED | OUTPATIENT
Start: 2020-01-10 | End: 2020-01-11 | Stop reason: HOSPADM

## 2020-01-10 RX ORDER — SODIUM CHLORIDE 0.9 % (FLUSH) 0.9 %
10 SYRINGE (ML) INJECTION AS NEEDED
Status: DISCONTINUED | OUTPATIENT
Start: 2020-01-10 | End: 2020-01-10 | Stop reason: HOSPADM

## 2020-01-10 RX ORDER — SODIUM CHLORIDE, SODIUM LACTATE, POTASSIUM CHLORIDE, CALCIUM CHLORIDE 600; 310; 30; 20 MG/100ML; MG/100ML; MG/100ML; MG/100ML
9 INJECTION, SOLUTION INTRAVENOUS CONTINUOUS
Status: DISCONTINUED | OUTPATIENT
Start: 2020-01-10 | End: 2020-01-10 | Stop reason: HOSPADM

## 2020-01-10 RX ORDER — DEXAMETHASONE SODIUM PHOSPHATE 10 MG/ML
INJECTION INTRAMUSCULAR; INTRAVENOUS AS NEEDED
Status: DISCONTINUED | OUTPATIENT
Start: 2020-01-10 | End: 2020-01-10 | Stop reason: SURG

## 2020-01-10 RX ORDER — NALOXONE HCL 0.4 MG/ML
0.4 VIAL (ML) INJECTION
Status: DISCONTINUED | OUTPATIENT
Start: 2020-01-10 | End: 2020-01-11 | Stop reason: HOSPADM

## 2020-01-10 RX ORDER — ACETAMINOPHEN 500 MG
1000 TABLET ORAL ONCE
Status: COMPLETED | OUTPATIENT
Start: 2020-01-10 | End: 2020-01-10

## 2020-01-10 RX ORDER — HYDRALAZINE HYDROCHLORIDE 20 MG/ML
10 INJECTION INTRAMUSCULAR; INTRAVENOUS
Status: DISCONTINUED | OUTPATIENT
Start: 2020-01-10 | End: 2020-01-11 | Stop reason: HOSPADM

## 2020-01-10 RX ORDER — SODIUM CHLORIDE, SODIUM LACTATE, POTASSIUM CHLORIDE, CALCIUM CHLORIDE 600; 310; 30; 20 MG/100ML; MG/100ML; MG/100ML; MG/100ML
150 INJECTION, SOLUTION INTRAVENOUS CONTINUOUS
Status: DISCONTINUED | OUTPATIENT
Start: 2020-01-10 | End: 2020-01-10 | Stop reason: HOSPADM

## 2020-01-10 RX ORDER — ONDANSETRON 4 MG/1
4 TABLET, FILM COATED ORAL EVERY 6 HOURS PRN
Status: DISCONTINUED | OUTPATIENT
Start: 2020-01-11 | End: 2020-01-11 | Stop reason: HOSPADM

## 2020-01-10 RX ORDER — CHLORHEXIDINE GLUCONATE 0.12 MG/ML
30 RINSE ORAL
Status: COMPLETED | OUTPATIENT
Start: 2020-01-10 | End: 2020-01-10

## 2020-01-10 RX ORDER — PANTOPRAZOLE SODIUM 40 MG/10ML
40 INJECTION, POWDER, LYOPHILIZED, FOR SOLUTION INTRAVENOUS ONCE
Status: COMPLETED | OUTPATIENT
Start: 2020-01-10 | End: 2020-01-10

## 2020-01-10 RX ORDER — MAGNESIUM HYDROXIDE 1200 MG/15ML
LIQUID ORAL AS NEEDED
Status: DISCONTINUED | OUTPATIENT
Start: 2020-01-10 | End: 2020-01-10 | Stop reason: HOSPADM

## 2020-01-10 RX ORDER — CEFAZOLIN SODIUM 2 G/100ML
2 INJECTION, SOLUTION INTRAVENOUS EVERY 8 HOURS
Status: COMPLETED | OUTPATIENT
Start: 2020-01-10 | End: 2020-01-11

## 2020-01-10 RX ORDER — ESCITALOPRAM OXALATE 20 MG/1
20 TABLET ORAL DAILY
Status: DISCONTINUED | OUTPATIENT
Start: 2020-01-10 | End: 2020-01-11 | Stop reason: HOSPADM

## 2020-01-10 RX ORDER — HYDROCODONE BITARTRATE AND ACETAMINOPHEN 7.5; 325 MG/1; MG/1
1 TABLET ORAL EVERY 4 HOURS PRN
Status: DISCONTINUED | OUTPATIENT
Start: 2020-01-10 | End: 2020-01-11 | Stop reason: HOSPADM

## 2020-01-10 RX ORDER — ALPRAZOLAM 0.25 MG/1
0.25 TABLET ORAL ONCE AS NEEDED
Status: DISCONTINUED | OUTPATIENT
Start: 2020-01-10 | End: 2020-01-11 | Stop reason: HOSPADM

## 2020-01-10 RX ORDER — CEFAZOLIN SODIUM 2 G/100ML
2 INJECTION, SOLUTION INTRAVENOUS ONCE
Status: DISCONTINUED | OUTPATIENT
Start: 2020-01-10 | End: 2020-01-10 | Stop reason: HOSPADM

## 2020-01-10 RX ORDER — DIPHENHYDRAMINE HYDROCHLORIDE 50 MG/ML
25 INJECTION INTRAMUSCULAR; INTRAVENOUS EVERY 4 HOURS PRN
Status: DISCONTINUED | OUTPATIENT
Start: 2020-01-10 | End: 2020-01-11 | Stop reason: HOSPADM

## 2020-01-10 RX ORDER — SIMETHICONE 80 MG
80 TABLET,CHEWABLE ORAL 4 TIMES DAILY PRN
Status: DISCONTINUED | OUTPATIENT
Start: 2020-01-10 | End: 2020-01-11 | Stop reason: HOSPADM

## 2020-01-10 RX ORDER — ONDANSETRON 2 MG/ML
4 INJECTION INTRAMUSCULAR; INTRAVENOUS ONCE AS NEEDED
Status: DISCONTINUED | OUTPATIENT
Start: 2020-01-10 | End: 2020-01-10 | Stop reason: HOSPADM

## 2020-01-10 RX ORDER — PROMETHAZINE HYDROCHLORIDE 25 MG/ML
12.5 INJECTION, SOLUTION INTRAMUSCULAR; INTRAVENOUS EVERY 6 HOURS PRN
Status: DISCONTINUED | OUTPATIENT
Start: 2020-01-10 | End: 2020-01-11 | Stop reason: HOSPADM

## 2020-01-10 RX ORDER — FENTANYL CITRATE 50 UG/ML
50 INJECTION, SOLUTION INTRAMUSCULAR; INTRAVENOUS
Status: DISCONTINUED | OUTPATIENT
Start: 2020-01-10 | End: 2020-01-10 | Stop reason: HOSPADM

## 2020-01-10 RX ORDER — PROPOFOL 10 MG/ML
VIAL (ML) INTRAVENOUS AS NEEDED
Status: DISCONTINUED | OUTPATIENT
Start: 2020-01-10 | End: 2020-01-10 | Stop reason: SURG

## 2020-01-10 RX ORDER — CYANOCOBALAMIN 1000 UG/ML
1000 INJECTION, SOLUTION INTRAMUSCULAR; SUBCUTANEOUS ONCE
Status: COMPLETED | OUTPATIENT
Start: 2020-01-11 | End: 2020-01-11

## 2020-01-10 RX ORDER — HYDROMORPHONE HYDROCHLORIDE 2 MG/1
2 TABLET ORAL EVERY 4 HOURS PRN
Status: DISCONTINUED | OUTPATIENT
Start: 2020-01-10 | End: 2020-01-11 | Stop reason: HOSPADM

## 2020-01-10 RX ORDER — ACETAMINOPHEN 500 MG
1000 TABLET ORAL EVERY 12 HOURS
Status: DISCONTINUED | OUTPATIENT
Start: 2020-01-10 | End: 2020-01-11 | Stop reason: HOSPADM

## 2020-01-10 RX ORDER — SODIUM CHLORIDE 0.9 % (FLUSH) 0.9 %
10 SYRINGE (ML) INJECTION EVERY 12 HOURS SCHEDULED
Status: DISCONTINUED | OUTPATIENT
Start: 2020-01-10 | End: 2020-01-10 | Stop reason: HOSPADM

## 2020-01-10 RX ORDER — SODIUM CHLORIDE 0.9 % (FLUSH) 0.9 %
3-10 SYRINGE (ML) INJECTION AS NEEDED
Status: DISCONTINUED | OUTPATIENT
Start: 2020-01-10 | End: 2020-01-10 | Stop reason: HOSPADM

## 2020-01-10 RX ORDER — LIDOCAINE HYDROCHLORIDE 20 MG/ML
INJECTION, SOLUTION INFILTRATION; PERINEURAL AS NEEDED
Status: DISCONTINUED | OUTPATIENT
Start: 2020-01-10 | End: 2020-01-10 | Stop reason: SURG

## 2020-01-10 RX ORDER — SODIUM CHLORIDE 0.9 % (FLUSH) 0.9 %
3 SYRINGE (ML) INJECTION EVERY 12 HOURS SCHEDULED
Status: DISCONTINUED | OUTPATIENT
Start: 2020-01-10 | End: 2020-01-10 | Stop reason: HOSPADM

## 2020-01-10 RX ORDER — BUPRENORPHINE HYDROCHLORIDE 0.32 MG/ML
INJECTION INTRAMUSCULAR; INTRAVENOUS
Status: COMPLETED | OUTPATIENT
Start: 2020-01-10 | End: 2020-01-10

## 2020-01-10 RX ORDER — MORPHINE SULFATE 4 MG/ML
4 INJECTION, SOLUTION INTRAMUSCULAR; INTRAVENOUS
Status: DISCONTINUED | OUTPATIENT
Start: 2020-01-10 | End: 2020-01-11 | Stop reason: HOSPADM

## 2020-01-10 RX ORDER — DEXAMETHASONE SODIUM PHOSPHATE 10 MG/ML
INJECTION, SOLUTION INTRAMUSCULAR; INTRAVENOUS
Status: COMPLETED | OUTPATIENT
Start: 2020-01-10 | End: 2020-01-10

## 2020-01-10 RX ORDER — GLYCOPYRROLATE 0.2 MG/ML
INJECTION INTRAMUSCULAR; INTRAVENOUS AS NEEDED
Status: DISCONTINUED | OUTPATIENT
Start: 2020-01-10 | End: 2020-01-10 | Stop reason: SURG

## 2020-01-10 RX ORDER — LORAZEPAM 2 MG/ML
0.5 INJECTION INTRAMUSCULAR EVERY 12 HOURS PRN
Status: DISCONTINUED | OUTPATIENT
Start: 2020-01-10 | End: 2020-01-11 | Stop reason: HOSPADM

## 2020-01-10 RX ORDER — PROCHLORPERAZINE MALEATE 10 MG
10 TABLET ORAL EVERY 6 HOURS PRN
Status: DISCONTINUED | OUTPATIENT
Start: 2020-01-10 | End: 2020-01-11 | Stop reason: HOSPADM

## 2020-01-10 RX ORDER — METOCLOPRAMIDE HYDROCHLORIDE 5 MG/ML
10 INJECTION INTRAMUSCULAR; INTRAVENOUS EVERY 6 HOURS PRN
Status: DISCONTINUED | OUTPATIENT
Start: 2020-01-10 | End: 2020-01-11 | Stop reason: HOSPADM

## 2020-01-10 RX ORDER — ONDANSETRON 2 MG/ML
4 INJECTION INTRAMUSCULAR; INTRAVENOUS EVERY 6 HOURS
Status: DISPENSED | OUTPATIENT
Start: 2020-01-10 | End: 2020-01-11

## 2020-01-10 RX ORDER — ONDANSETRON 2 MG/ML
INJECTION INTRAMUSCULAR; INTRAVENOUS AS NEEDED
Status: DISCONTINUED | OUTPATIENT
Start: 2020-01-10 | End: 2020-01-10 | Stop reason: SURG

## 2020-01-10 RX ORDER — NALOXONE HCL 0.4 MG/ML
0.1 VIAL (ML) INJECTION
Status: DISCONTINUED | OUTPATIENT
Start: 2020-01-10 | End: 2020-01-11 | Stop reason: HOSPADM

## 2020-01-10 RX ORDER — SODIUM CHLORIDE AND POTASSIUM CHLORIDE 150; 450 MG/100ML; MG/100ML
125 INJECTION, SOLUTION INTRAVENOUS CONTINUOUS
Status: DISCONTINUED | OUTPATIENT
Start: 2020-01-11 | End: 2020-01-11 | Stop reason: HOSPADM

## 2020-01-10 RX ORDER — GABAPENTIN 300 MG/1
600 CAPSULE ORAL ONCE
Status: COMPLETED | OUTPATIENT
Start: 2020-01-10 | End: 2020-01-10

## 2020-01-10 RX ORDER — GABAPENTIN 100 MG/1
100 CAPSULE ORAL 3 TIMES DAILY
Status: DISCONTINUED | OUTPATIENT
Start: 2020-01-10 | End: 2020-01-11 | Stop reason: HOSPADM

## 2020-01-10 RX ADMIN — ATRACURIUM BESYLATE 10 MG: 10 INJECTION, SOLUTION INTRAVENOUS at 10:02

## 2020-01-10 RX ADMIN — ATRACURIUM BESYLATE 5 MG: 10 INJECTION, SOLUTION INTRAVENOUS at 11:18

## 2020-01-10 RX ADMIN — SUCCINYLCHOLINE CHLORIDE 60 MG: 20 INJECTION, SOLUTION INTRAMUSCULAR; INTRAVENOUS; PARENTERAL at 09:22

## 2020-01-10 RX ADMIN — LIDOCAINE HYDROCHLORIDE 40 MG: 20 INJECTION, SOLUTION INFILTRATION; PERINEURAL at 09:22

## 2020-01-10 RX ADMIN — BUPIVACAINE HYDROCHLORIDE 60 ML: 2.5 INJECTION, SOLUTION EPIDURAL; INFILTRATION; INTRACAUDAL; PERINEURAL at 09:30

## 2020-01-10 RX ADMIN — SODIUM CHLORIDE, POTASSIUM CHLORIDE, SODIUM LACTATE AND CALCIUM CHLORIDE 150 ML/HR: 600; 310; 30; 20 INJECTION, SOLUTION INTRAVENOUS at 07:42

## 2020-01-10 RX ADMIN — ROCURONIUM BROMIDE 27 MG: 10 INJECTION INTRAVENOUS at 09:30

## 2020-01-10 RX ADMIN — ONDANSETRON 4 MG: 2 INJECTION INTRAMUSCULAR; INTRAVENOUS at 16:53

## 2020-01-10 RX ADMIN — PROPOFOL 150 MG: 10 INJECTION, EMULSION INTRAVENOUS at 09:22

## 2020-01-10 RX ADMIN — SODIUM CHLORIDE, POTASSIUM CHLORIDE, SODIUM LACTATE AND CALCIUM CHLORIDE 500 ML: 600; 310; 30; 20 INJECTION, SOLUTION INTRAVENOUS at 07:28

## 2020-01-10 RX ADMIN — SODIUM CHLORIDE, POTASSIUM CHLORIDE, SODIUM LACTATE AND CALCIUM CHLORIDE: 600; 310; 30; 20 INJECTION, SOLUTION INTRAVENOUS at 11:29

## 2020-01-10 RX ADMIN — CHLORHEXIDINE GLUCONATE 0.12% ORAL RINSE 15 ML: 1.2 LIQUID ORAL at 07:37

## 2020-01-10 RX ADMIN — ONDANSETRON 4 MG: 2 INJECTION INTRAMUSCULAR; INTRAVENOUS at 11:24

## 2020-01-10 RX ADMIN — ACETAMINOPHEN 1000 MG: 500 TABLET, FILM COATED ORAL at 21:58

## 2020-01-10 RX ADMIN — BUPRENORPHINE HYDROCHLORIDE 0.3 MG: 0.32 INJECTION INTRAMUSCULAR; INTRAVENOUS at 09:30

## 2020-01-10 RX ADMIN — SODIUM CHLORIDE, POTASSIUM CHLORIDE, SODIUM LACTATE AND CALCIUM CHLORIDE 150 ML/HR: 600; 310; 30; 20 INJECTION, SOLUTION INTRAVENOUS at 23:56

## 2020-01-10 RX ADMIN — CHLORHEXIDINE GLUCONATE 0.12% ORAL RINSE 15 ML: 1.2 LIQUID ORAL at 07:29

## 2020-01-10 RX ADMIN — GABAPENTIN 100 MG: 100 CAPSULE ORAL at 16:54

## 2020-01-10 RX ADMIN — CARVEDILOL 12.5 MG: 12.5 TABLET, FILM COATED ORAL at 17:48

## 2020-01-10 RX ADMIN — ATRACURIUM BESYLATE 10 MG: 10 INJECTION, SOLUTION INTRAVENOUS at 10:40

## 2020-01-10 RX ADMIN — CEFAZOLIN SODIUM 2 G: 2 INJECTION, SOLUTION INTRAVENOUS at 16:53

## 2020-01-10 RX ADMIN — GLYCOPYRROLATE 0.4 MG: 0.2 INJECTION, SOLUTION INTRAMUSCULAR; INTRAVENOUS at 11:35

## 2020-01-10 RX ADMIN — EPHEDRINE SULFATE 10 MG: 50 INJECTION INTRAMUSCULAR; INTRAVENOUS; SUBCUTANEOUS at 09:56

## 2020-01-10 RX ADMIN — MINOCYCLINE HYDROCHLORIDE 100 MG: 50 CAPSULE ORAL at 16:53

## 2020-01-10 RX ADMIN — LIDOCAINE HYDROCHLORIDE 0.5 ML: 10 INJECTION, SOLUTION EPIDURAL; INFILTRATION; INTRACAUDAL; PERINEURAL at 07:28

## 2020-01-10 RX ADMIN — SCOPALAMINE 1 PATCH: 1 PATCH, EXTENDED RELEASE TRANSDERMAL at 07:29

## 2020-01-10 RX ADMIN — EPHEDRINE SULFATE 10 MG: 50 INJECTION INTRAMUSCULAR; INTRAVENOUS; SUBCUTANEOUS at 09:53

## 2020-01-10 RX ADMIN — DEXAMETHASONE SODIUM PHOSPHATE 4 MG: 10 INJECTION INTRAMUSCULAR; INTRAVENOUS at 09:30

## 2020-01-10 RX ADMIN — PROPOFOL 25 MCG/KG/MIN: 10 INJECTION, EMULSION INTRAVENOUS at 09:22

## 2020-01-10 RX ADMIN — GABAPENTIN 100 MG: 100 CAPSULE ORAL at 21:59

## 2020-01-10 RX ADMIN — DEXAMETHASONE SODIUM PHOSPHATE 8 MG: 10 INJECTION INTRAMUSCULAR; INTRAVENOUS at 09:46

## 2020-01-10 RX ADMIN — GABAPENTIN 600 MG: 300 CAPSULE ORAL at 07:29

## 2020-01-10 RX ADMIN — EPHEDRINE SULFATE 10 MG: 50 INJECTION INTRAMUSCULAR; INTRAVENOUS; SUBCUTANEOUS at 10:55

## 2020-01-10 RX ADMIN — ESCITALOPRAM OXALATE 20 MG: 20 TABLET ORAL at 16:53

## 2020-01-10 RX ADMIN — ACETAMINOPHEN 1000 MG: 500 TABLET ORAL at 07:29

## 2020-01-10 RX ADMIN — NEOSTIGMINE METHYLSULFATE 2.5 MG: 1 INJECTION, SOLUTION INTRAVENOUS at 11:35

## 2020-01-10 RX ADMIN — PANTOPRAZOLE SODIUM 40 MG: 40 INJECTION, POWDER, FOR SOLUTION INTRAVENOUS at 07:32

## 2020-01-10 RX ADMIN — ROCURONIUM BROMIDE 3 MG: 10 INJECTION INTRAVENOUS at 09:21

## 2020-01-10 NOTE — PAYOR COMM NOTE
"Iris Pimentel (54 y.o. Female)     Katharine Pardo, RN  [phone]  F [phone]    Scheduled outpatient surgery with auth [id number].Converted to inpatient.     Date of Birth Social Security Number Address Home Phone MRN    1965  [address] [facility] [address]  [id number]    [employer] Marital Status          Gnosticist        Admission Date Admission Type Admitting Provider Attending Provider Department, Room/Bed    1/10/20 Elective Jose Tyler MD Weiss, George Derek, MD [facility] OR, [facility] OR/NONE    Discharge Date Discharge Disposition Discharge Destination                       Attending Provider:  Jose Tyler MD    Allergies:  Lisinopril    Isolation:  None   Infection:  None   Code Status:  CPR    Ht:  147.3 cm (58\")   Wt:  61.7 kg (136 lb)    Admission Cmt:  None   Principal Problem:  None                Active Insurance as of 1/10/2020     Primary Coverage     Payor Plan Insurance Group Employer/Plan Group    [facility] ybuy [facility] [employer] [facility]      Payor Plan Address Payor Plan Phone Number Payor Plan Fax Number Effective Dates    [address] [address]   12/1/2016 - None Entered    [place] [address]       Subscriber Name Subscriber Birth Date Member ID       IRIS PIMENTEL 1965 [id number]                 Emergency Contacts      (Rel.) Home Phone Work Phone Mobile Phone    Lalo Tarango (Brother) [phone] -- --               History & Physical      Jose Tyler MD at 01/10/20 0906          H&P reviewed. The patient was examined and there are no changes to the H&P.          Electronically signed by Jose Tyler MD at 01/10/20 0906   Source Note          [facility] Bariatric Surgery  [address]  [address]  [facility] [address] [phone]        Patient Name: Iris Pimentel.  YOB: 1965      Date of Visit: 01/08/2020      CC:  uncontrolled reflux, " "recurrent HH, possible GOO    HPI:  54 y.o. female s/p LSG/HHR 8/2013 GDW    Presented to office 10/2019 c/o postprandial N/V/reflux w/ associated epigastric pain/fullness, persistent/worsening hoarseness of unclear etiology.      Eval included:  UGI 10/16/19 @[facility] revealing moderate reflux and \"small outpouching of contrast seen at the proximal portion of the vertical sleeve. This outpouching appears to be intraluminal, and may represent a small gastric diverticulum, or possibly postsurgical changes.\"  EGD 11/4/19 w/ Dr. Tyler revealing partial outlet obstruction.    Saw Dr. Tyler in consultation 11/19/19, from that eval: \"I did her EGD recently and had her follow-up in the office today to discuss options.  EGD on 11/4/2019 showed a shortened Z line at 35 cm, redundant cardia possibly causing a partial gastric outlet obstruction no visible gastritis.  At the time I did her laparoscopic cholecystectomy in September 2018 her sleeve appeared to be resting nicely.  Endoscopy at that time showed a small amount of redundant cardia no recurrent hiatal hernia Z line 36 cm.  Recent upper GI shows a small outpouching of the proximal sleeve which is felt to represent a small gastric diverticulum or postsurgical changes and moderate reflux to the level of the thoracic inlet.    On my review there appears to be redundant cardia and also a C-shaped twist of the sleeve with kinking at the angularis.  On most recent endoscopy the sleeve itself was a nice uniform tube down to the angularis which is widely patent without twist or narrowing the only abnormality being the redundant cardia.  Pathology of the antrum was negative for H. pylori and distal esophageal biopsies were suggestive of reflux.  She says another family member of hers is in the process of trying to have weight loss surgery with me.  She says she is very pleased with her sleeve results and no longer has diabetes and her blood pressure is now well regulated.\"    Past " Medical History:   Diagnosis Date   • Acne     on Minocycline   • Allergic rhinitis    • Anxiety    • Anxiety    • Arthritis    • Chronic back pain    • Depression    • Diabetes mellitus (CMS/HCC)    • Dyspnea on exertion    • Eczema     prn steroid cream   • Elevated LFTs    • Fatigue    • GERD (gastroesophageal reflux disease)    • Helicobacter pylori (H. pylori) infection    • Hiatal hernia    • Hormone replacement therapy (HRT)     s/p TAHBSO   • Hyperlipidemia    • Hypertension    • Hypokalemia    • Insomnia    • Joint pain    • Obesity    • Peripheral edema     daily Maxzide   • Renal insufficiency    • Vitamin D deficiency    • Wears glasses      Past Surgical History:   Procedure Laterality Date   • BREAST BIOPSY Right 2012   •  SECTION  ,    • COLONOSCOPY     • GASTRIC SLEEVE LAPAROSCOPIC  2013    HHR   GDW   • HYSTERECTOMY      w/ oophorectomy   • LAPAROSCOPIC CHOLECYSTECTOMY  2018    w/ PAIGE by Dr. Tyler   • TONSILLECTOMY           Current Outpatient Medications:   •  carvedilol (COREG) 12.5 MG tablet, Take 12.5 mg by mouth 2 (Two) Times a Day With Meals., Disp: , Rfl:   •  escitalopram (LEXAPRO) 20 MG tablet, Take 20 mg by mouth Daily., Disp: , Rfl:   •  estradiol (MINIVELLE, VIVELLE-DOT) 0.1 MG/24HR patch, Place 1 patch on the skin as directed by provider 2 (Two) Times a Week., Disp: , Rfl:   •  minocycline (MINOCIN,DYNACIN) 100 MG capsule, Take 100 mg by mouth Daily., Disp: , Rfl:   •  triamcinolone (KENALOG) 0.1 % ointment, Apply  topically to the appropriate area as directed As Needed., Disp: , Rfl:   •  triamterene-hydrochlorothiazide (DYAZIDE) 37.5-25 MG per capsule, Take 1 capsule by mouth Daily., Disp: , Rfl:   •  omeprazole (priLOSEC) 40 MG capsule, Take 1 capsule by mouth Daily., Disp: 30 capsule, Rfl: 0  •  promethazine (PHENERGAN) 25 MG tablet, Take 0.5 tablets by mouth Every 6 (Six) Hours As Needed for Nausea., Disp: 30 tablet, Rfl: 0    Allergies  "  Allergen Reactions   • Lisinopril Swelling     Lips       Family History   Problem Relation Age of Onset   • Cancer Mother    • Diabetes Mother    • Hypertension Mother    • Stroke Mother    • Hypertension Sister    • Hypertension Brother    • Diabetes Maternal Grandmother    • Hypertension Maternal Grandfather    • Stroke Maternal Grandfather    • Cancer Paternal Grandmother      Social History     Socioeconomic History   • Marital status:      Spouse name: Not on file   • Number of children: Not on file   • Years of education: Not on file   • Highest education level: Not on file   Tobacco Use   • Smoking status: Never Smoker   • Smokeless tobacco: Never Used   Substance and Sexual Activity   • Alcohol use: No   • Drug use: No   • Sexual activity: Defer     Review of Systems   Constitutional: Positive for fatigue. Negative for chills, diaphoresis, fever and unexpected weight loss.   HENT: Negative for congestion and facial swelling.    Eyes: Negative for blurred vision, double vision and discharge.   Respiratory: Negative for chest tightness, shortness of breath and stridor.    Cardiovascular: Negative for chest pain, palpitations and leg swelling.   Gastrointestinal: Positive for GERD. Negative for blood in stool.   Endocrine: Negative for polydipsia.   Genitourinary: Negative for hematuria.   Musculoskeletal: Positive for arthralgias.   Skin: Negative for color change.   Allergic/Immunologic: Negative for immunocompromised state.   Neurological: Negative for confusion.   Psychiatric/Behavioral: Negative for self-injury.     Reviewed today and is without changes.    /72 (BP Location: Left arm, Patient Position: Sitting, Cuff Size: Adult)   Pulse 68   Temp 96.3 °F (35.7 °C) (Temporal)   Resp 18   Ht 147.3 cm (58\")   Wt 61.7 kg (136 lb)   SpO2 100%   BMI 28.42 kg/m²      Physical Exam   Constitutional: She appears well-developed and well-nourished. She is cooperative.   HENT:   Mouth/Throat: " "Oropharynx is clear and moist and mucous membranes are normal.   Eyes: Conjunctivae are normal. No scleral icterus.   Cardiovascular: Normal rate.   Pulmonary/Chest: Effort normal.   Abdominal: Soft. There is no tenderness.   Musculoskeletal: Normal range of motion. She exhibits no edema.   Neurological: She is alert.   Skin: Skin is warm and dry. No rash noted.   Psychiatric: She has a normal mood and affect. Judgment normal.         Assessment:   54 y.o. female s/p LSG/HHR 8/2013 GDW    ICD-10-CM ICD-9-CM   1. Gastroesophageal reflux disease, esophagitis presence not specified K21.9 530.81   2. Hiatal hernia K44.9 553.3   3. Gastric outlet obstruction K31.1 537.0     From Dr. Tyler's evaluation 11/19/19:  \"54 y.o. year old female with recurrent significant reflux and possible recurrent hiatal hernia, possible partial gastric outlet obstruction status post laparoscopic sleeve gastrectomy and hiatal hernia repair in August 2013.  I went over the risks, benefits, and alternative therapies at length with diagrams and she wishes to proceed with laparoscopic recurrent hiatal hernia repair and if endoscopically the partial gastric outlet obstruction is still present,  Laparoscopic proximal gastrojejunostomy to the sleeve above the partial GOO.  Also after discussion of the risks benefits alternative therapies she is not interested in conversion to a formal Diogenes-en-Y gastric bypass at this time and understands that this would be the most common recommendation given her symptoms and presentation.  I encouraged her to seek second opinion(s), but says she is not interested at this time.      Plan:  Laparoscopic Recurrent Hiatal Hernia Repair, EGD, possible Laparoscopic Gastrojejunostomy.  Reviewed surgical plan.  All questions answered.      CATHY Villalpando    Electronically signed by Daisy Parsons PA at 01/09/20 1644             Daisy Parsons PA at 01/08/20 1400          [facility] " "Bariatric Surgery  [address]  [address]  [facility] [address]  [phone]        Patient Name: Iris Varma.  YOB: 1965      Date of Visit: 01/08/2020      CC:  uncontrolled reflux, recurrent HH, possible GOO    HPI:  54 y.o. female s/p LSG/HHR 8/2013 GDW    Presented to office 10/2019 c/o postprandial N/V/reflux w/ associated epigastric pain/fullness, persistent/worsening hoarseness of unclear etiology.      Eval included:  UGI 10/16/19 @[facility] revealing moderate reflux and \"small outpouching of contrast seen at the proximal portion of the vertical sleeve. This outpouching appears to be intraluminal, and may represent a small gastric diverticulum, or possibly postsurgical changes.\"  EGD 11/4/19 w/ Dr. Tyler revealing partial outlet obstruction.    Saw Dr. Tyler in consultation 11/19/19, from that eval: \"I did her EGD recently and had her follow-up in the office today to discuss options.  EGD on 11/4/2019 showed a shortened Z line at 35 cm, redundant cardia possibly causing a partial gastric outlet obstruction no visible gastritis.  At the time I did her laparoscopic cholecystectomy in September 2018 her sleeve appeared to be resting nicely.  Endoscopy at that time showed a small amount of redundant cardia no recurrent hiatal hernia Z line 36 cm.  Recent upper GI shows a small outpouching of the proximal sleeve which is felt to represent a small gastric diverticulum or postsurgical changes and moderate reflux to the level of the thoracic inlet.    On my review there appears to be redundant cardia and also a C-shaped twist of the sleeve with kinking at the angularis.  On most recent endoscopy the sleeve itself was a nice uniform tube down to the angularis which is widely patent without twist or narrowing the only abnormality being the redundant cardia.  Pathology of the antrum was negative for H. pylori and distal esophageal biopsies were suggestive of reflux.  She says another family " "member of hers is in the process of trying to have weight loss surgery with me.  She says she is very pleased with her sleeve results and no longer has diabetes and her blood pressure is now well regulated.\"    Past Medical History:   Diagnosis Date   • Acne     on Minocycline   • Allergic rhinitis    • Anxiety    • Anxiety    • Arthritis    • Chronic back pain    • Depression    • Diabetes mellitus (CMS/HCC)    • Dyspnea on exertion    • Eczema     prn steroid cream   • Elevated LFTs    • Fatigue    • GERD (gastroesophageal reflux disease)    • Helicobacter pylori (H. pylori) infection    • Hiatal hernia    • Hormone replacement therapy (HRT)     s/p TAHBSO   • Hyperlipidemia    • Hypertension    • Hypokalemia    • Insomnia    • Joint pain    • Obesity    • Peripheral edema     daily Maxzide   • Renal insufficiency    • Vitamin D deficiency    • Wears glasses      Past Surgical History:   Procedure Laterality Date   • BREAST BIOPSY Right 2012   •  SECTION  ,    • COLONOSCOPY     • GASTRIC SLEEVE LAPAROSCOPIC  2013    HHR   GDW   • HYSTERECTOMY      w/ oophorectomy   • LAPAROSCOPIC CHOLECYSTECTOMY  2018    w/ PAIGE by Dr. Tyler   • TONSILLECTOMY           Current Outpatient Medications:   •  carvedilol (COREG) 12.5 MG tablet, Take 12.5 mg by mouth 2 (Two) Times a Day With Meals., Disp: , Rfl:   •  escitalopram (LEXAPRO) 20 MG tablet, Take 20 mg by mouth Daily., Disp: , Rfl:   •  estradiol (MINIVELLE, VIVELLE-DOT) 0.1 MG/24HR patch, Place 1 patch on the skin as directed by provider 2 (Two) Times a Week., Disp: , Rfl:   •  minocycline (MINOCIN,DYNACIN) 100 MG capsule, Take 100 mg by mouth Daily., Disp: , Rfl:   •  triamcinolone (KENALOG) 0.1 % ointment, Apply  topically to the appropriate area as directed As Needed., Disp: , Rfl:   •  triamterene-hydrochlorothiazide (DYAZIDE) 37.5-25 MG per capsule, Take 1 capsule by mouth Daily., Disp: , Rfl:   •  omeprazole (priLOSEC) 40 MG " capsule, Take 1 capsule by mouth Daily., Disp: 30 capsule, Rfl: 0  •  promethazine (PHENERGAN) 25 MG tablet, Take 0.5 tablets by mouth Every 6 (Six) Hours As Needed for Nausea., Disp: 30 tablet, Rfl: 0    Allergies   Allergen Reactions   • Lisinopril Swelling     Lips       Family History   Problem Relation Age of Onset   • Cancer Mother    • Diabetes Mother    • Hypertension Mother    • Stroke Mother    • Hypertension Sister    • Hypertension Brother    • Diabetes Maternal Grandmother    • Hypertension Maternal Grandfather    • Stroke Maternal Grandfather    • Cancer Paternal Grandmother      Social History     Socioeconomic History   • Marital status:      Spouse name: Not on file   • Number of children: Not on file   • Years of education: Not on file   • Highest education level: Not on file   Tobacco Use   • Smoking status: Never Smoker   • Smokeless tobacco: Never Used   Substance and Sexual Activity   • Alcohol use: No   • Drug use: No   • Sexual activity: Defer     Review of Systems   Constitutional: Positive for fatigue. Negative for chills, diaphoresis, fever and unexpected weight loss.   HENT: Negative for congestion and facial swelling.    Eyes: Negative for blurred vision, double vision and discharge.   Respiratory: Negative for chest tightness, shortness of breath and stridor.    Cardiovascular: Negative for chest pain, palpitations and leg swelling.   Gastrointestinal: Positive for GERD. Negative for blood in stool.   Endocrine: Negative for polydipsia.   Genitourinary: Negative for hematuria.   Musculoskeletal: Positive for arthralgias.   Skin: Negative for color change.   Allergic/Immunologic: Negative for immunocompromised state.   Neurological: Negative for confusion.   Psychiatric/Behavioral: Negative for self-injury.     Reviewed today and is without changes.    /72 (BP Location: Left arm, Patient Position: Sitting, Cuff Size: Adult)   Pulse 68   Temp 96.3 °F (35.7 °C) (Temporal)   " Resp 18   Ht 147.3 cm (58\")   Wt 61.7 kg (136 lb)   SpO2 100%   BMI 28.42 kg/m²      Physical Exam   Constitutional: She appears well-developed and well-nourished. She is cooperative.   HENT:   Mouth/Throat: Oropharynx is clear and moist and mucous membranes are normal.   Eyes: Conjunctivae are normal. No scleral icterus.   Cardiovascular: Normal rate.   Pulmonary/Chest: Effort normal.   Abdominal: Soft. There is no tenderness.   Musculoskeletal: Normal range of motion. She exhibits no edema.   Neurological: She is alert.   Skin: Skin is warm and dry. No rash noted.   Psychiatric: She has a normal mood and affect. Judgment normal.         Assessment:   54 y.o. female s/p LSG/HHR 8/2013 GDW    ICD-10-CM ICD-9-CM   1. Gastroesophageal reflux disease, esophagitis presence not specified K21.9 530.81   2. Hiatal hernia K44.9 553.3   3. Gastric outlet obstruction K31.1 537.0     From Dr. Tyler's evaluation 11/19/19:  \"54 y.o. year old female with recurrent significant reflux and possible recurrent hiatal hernia, possible partial gastric outlet obstruction status post laparoscopic sleeve gastrectomy and hiatal hernia repair in August 2013.  I went over the risks, benefits, and alternative therapies at length with diagrams and she wishes to proceed with laparoscopic recurrent hiatal hernia repair and if endoscopically the partial gastric outlet obstruction is still present,  Laparoscopic proximal gastrojejunostomy to the sleeve above the partial GOO.  Also after discussion of the risks benefits alternative therapies she is not interested in conversion to a formal Diogenes-en-Y gastric bypass at this time and understands that this would be the most common recommendation given her symptoms and presentation.  I encouraged her to seek second opinion(s), but says she is not interested at this time.      Plan:  Laparoscopic Recurrent Hiatal Hernia Repair, EGD, possible Laparoscopic Gastrojejunostomy.  Reviewed surgical plan.  " All questions answered.      CATHY Villalpando    Electronically signed by Daisy Parsons PA at 01/09/20 1644       Vital Signs (last day)     Date/Time   Temp   Temp src   Pulse   Resp   BP   Patient Position   SpO2    01/10/20 1155   97.2 (36.2)   Temporal   65   12   144/88   --   96    01/10/20 1150   97.2 (36.2)   Temporal   82   12   139/86   --   90    01/10/20 0712   97.4 (36.3)   Temporal   67   16   133/91   Lying   100                Current Facility-Administered Medications   Medication Dose Route Frequency Provider Last Rate Last Dose   • acetaminophen (TYLENOL) tablet 1,000 mg  1,000 mg Oral Q12H Jose Tyler MD       • albuterol (PROVENTIL) nebulizer solution 0.083% 2.5 mg/3mL  2.5 mg Nebulization Q4H PRN Jose Tyler MD       • ALPRAZolam (XANAX) tablet 0.25 mg  0.25 mg Oral Once PRN Joes Tyler MD       • carvedilol (COREG) tablet 12.5 mg  12.5 mg Oral BID With Meals Jose Tyler MD       • ceFAZolin in dextrose (ANCEF) IVPB solution 2 g  2 g Intravenous Q8H Jose Tyler MD       • [START ON 1/11/2020] cyanocobalamin injection 1,000 mcg  1,000 mcg Intramuscular Once Jose Tyler MD       • dexamethasone (DECADRON) injection 8 mg  8 mg Intravenous Once PRN Rm Parra CRNA       • diphenhydrAMINE (BENADRYL) injection 25 mg  25 mg Intravenous Q4H PRN Jose Tyler MD       • [START ON 1/11/2020] enoxaparin (LOVENOX) syringe 40 mg  40 mg Subcutaneous Daily Jose Tyler MD       • escitalopram (LEXAPRO) tablet 20 mg  20 mg Oral Daily Jose Tyler MD       • fentaNYL citrate (PF) (SUBLIMAZE) injection 50 mcg  50 mcg Intravenous Q5 Min PRN Rm Parra CRNA       • [START ON 1/11/2020] ferric gluconate (FERRLECIT)125 MG IVPB  125 mg Intravenous Once PRN Jose Tyler MD       • gabapentin (NEURONTIN) capsule 100 mg  100 mg Oral TID Jose Tyler MD       • hydrALAZINE (APRESOLINE) injection 10  mg  10 mg Intravenous Q30 Min PRN Jose Tyler MD       • HYDROcodone-acetaminophen (NORCO) 7.5-325 MG per tablet 1 tablet  1 tablet Oral Q4H PRN Jose Tyler MD       • HYDROmorphone (DILAUDID) injection 0.5 mg  0.5 mg Intravenous Q5 Min PRN Rm Parra CRNA       • HYDROmorphone (DILAUDID) injection 1 mg  1 mg Intravenous Q2H PRN Jose Tyler MD        And   • naloxone (NARCAN) injection 0.1 mg  0.1 mg Intravenous Q5 Min PRN Jose Tyler MD       • HYDROmorphone (DILAUDID) tablet 2 mg  2 mg Oral Q4H PRN Jose Tyler MD       • lactated ringers bolus 500 mL  500 mL Intravenous Once PRN Rm Parra CRNA       • lactated ringers infusion  9 mL/hr Intravenous Continuous Croin Albarran MD       • lactated ringers infusion  150 mL/hr Intravenous Continuous Jose Tyler  mL/hr at 01/10/20 0742     • lactated ringers infusion  150 mL/hr Intravenous Continuous Jose Tyler MD       • LORazepam (ATIVAN) injection 0.5 mg  0.5 mg Intravenous Q12H PRN Jose Tyler MD       • LORazepam (ATIVAN) tablet 1 mg  1 mg Oral Q12H PRN Jose Tyler MD       • metoclopramide (REGLAN) injection 10 mg  10 mg Intravenous Q6H PRN Jose Tyler MD       • minocycline (MINOCIN,DYNACIN) capsule 100 mg  100 mg Oral Daily Jose Tyler MD       • Morphine sulfate (PF) injection 4 mg  4 mg Intravenous Q2H PRN Jose Tyler MD        And   • naloxone (NARCAN) injection 0.4 mg  0.4 mg Intravenous Q5 Min PRN Jose Tyler MD       • [START ON 1/11/2020] multiple vitamin (M.V.I. Adult) 10 mL, thiamine (B-1) 100 mg, folic acid 1 mg in sodium chloride 0.9 % 1,000 mL infusion  250 mL/hr Intravenous Once Jose Tyler MD       • ondansetron (ZOFRAN) injection 4 mg  4 mg Intravenous Once PRN mR Parra CRNA       • ondansetron (ZOFRAN) injection 4 mg  4 mg Intravenous Q6H Jose Tyler MD        Followed  by   • [START ON 1/11/2020] ondansetron (ZOFRAN) tablet 4 mg  4 mg Oral Q6H PRN Jose Tyler MD       • [START ON 1/11/2020] pantoprazole (PROTONIX) injection 40 mg  40 mg Intravenous Q AM Jose Tyler MD       • phenol (CHLORASEPTIC) 1.4 % liquid 2 spray  2 spray Mouth/Throat Q2H PRN Jose Tyler MD       • prochlorperazine (COMPAZINE) tablet 10 mg  10 mg Oral Q6H PRN Jose Tyler MD       • promethazine (PHENERGAN) injection 12.5 mg  12.5 mg Intravenous Q6H PRN Jose Tyler MD        Or   • promethazine (PHENERGAN) injection 12.5 mg  12.5 mg Intramuscular Q6H PRN Jose Tyler MD       • promethazine (PHENERGAN) tablet 12.5 mg  12.5 mg Oral Q6H PRN Jose Tyler MD       • Scopolamine (TRANSDERM-SCOP) 1.5 MG/3DAYS patch 1 patch  1 patch Transdermal Once Jose Tyler MD   1 patch at 01/10/20 0729   • simethicone (MYLICON) chewable tablet 80 mg  80 mg Oral 4x Daily PRN Jose Tyler MD       • [START ON 1/11/2020] sodium chloride 0.45 % with KCl 20 mEq/L infusion  125 mL/hr Intravenous Continuous Jose Tyler MD           Lab Results (last 24 hours)     ** No results found for the last 24 hours. **        Imaging Results (Last 24 Hours)     ** No results found for the last 24 hours. **           Operative/Procedure Notes (last 24 hours) (Notes from 01/09/20 1201 through 01/10/20 1201)      Jose Tyler MD at 01/10/20 0946          HIATAL HERNIA REPAIR LAPAROSCOPIC, GASTRECTOMY LAPAROSCOPIC, ESOPHAGOGASTRODUODENOSCOPY  Progress Note    Iris Varma  1/10/2020    Pre-op Diagnosis:   Gastric outlet obstruction [K31.1]  Hiatal hernia [K44.9]       Post-Op Diagnosis Codes:     * Gastric outlet obstruction [K31.1]     * Hiatal hernia [K44.9]    Procedure/CPT® Codes:  IL LAP,ESOPHAGOGAST FUNDOPLASTY [26110]  IL UNLISTED LAPAROSCOPY PROC,INTESTINE [68245]  IL ESOPHAGOGASTRODUODENOSCOPY TRANSORAL DIAGNOSTIC  [32334]    Procedure(s):  HIATAL HERNIA REPAIR LAPAROSCOPIC  GASTRECTOMY LAPAROSCOPIC  ESOPHAGOGASTRODUODENOSCOPY    Surgeon(s):  Jose Tyler MD    Anesthesia: General with Block    Staff:   Circulator: Ana Luisa Maurer, ANTOLIN; Quiana Torres RN; Kierra Abdul RN  Scrub Person: Marci Barr; Sangita Ryan  Nursing Assistant: Mally Major; Samara Saucedo    Estimated Blood Loss: 100ml    Urine Voided: * No values recorded between 1/10/2020  9:17 AM and 1/10/2020 11:42 AM *    Specimens:                Specimens     ID Source Type Tests Collected By Collected At Frozen?      A Stomach Tissue · TISSUE PATHOLOGY EXAM   Jose Tyler MD 1/10/20 3202      Description: subtotal gastrectomy for permanent     This specimen was not marked as sent.                Drains: * No LDAs found *    Findings: Dense nydia-hiatal adhesions.  Very small subtle recurrent hiatal hernia.  Excessive redundant cardia encountered and excised.  Post procedure EGD unremarkable post LSG.    Complications: none      Jose Tyler MD     Date: 1/10/2020  Time: 11:42 AM        Electronically signed by Jose Tyler MD at 01/10/20 8635

## 2020-01-10 NOTE — ANESTHESIA PROCEDURE NOTES
Peripheral Block      Patient reassessed immediately prior to procedure    Patient location during procedure: OR  Reason for block: at surgeon's request and post-op pain management  Performed by  Anesthesiologist: Corin Albarran MD  Preanesthetic Checklist  Completed: patient identified, site marked, surgical consent, pre-op evaluation, timeout performed, IV checked, risks and benefits discussed and monitors and equipment checked  Prep:  Pt Position: supine  Sterile barriers:cap, gloves, sterile barriers and mask  Prep: ChloraPrep  Patient monitoring: blood pressure monitoring, continuous pulse oximetry and EKG  Procedure  Sedation:yes  Performed under: general  Guidance:ultrasound guided  Images:still images obtained, printed/placed on chart    Laterality:Bilateral  Block Type:TAP  Injection Technique:single-shot  Needle Type:short-bevel and echogenic  Needle Gauge:20 G  Resistance on Injection: none    Medications Used: buprenorphine (BUPRENEX) injection, 0.3 mg  dexamethasone sodium phosphate injection, 4 mg  bupivacaine PF (MARCAINE) 0.25 % injection, 60 mL  Med admintered at 1/10/2020 9:30 AM      Medications  Comment:Block Injection:  LA dose divided between Right and Left block        Post Assessment  Injection Assessment: negative aspiration for heme, incremental injection and no paresthesia on injection  Patient Tolerance:comfortable throughout block  Complications:no  Additional Notes      Under Ultrasound guidance, a BBraun 4inch 360 degree needle was advanced with Normal Saline hydro dissection of tissue.  The Internal Oblique and Transversus Abdominus muscles where visualized.  At or before the aponeurosis of Internal Oblique, local anesthetic spread was visualized in the Transversus Abdominus Plane. Injection was made incrementally with aspiration every 5 mls.  There was no  intravascular injection,  injection pressure was normal, there was no neural injection, and the procedure was completed  without difficulty.  Thank You.

## 2020-01-10 NOTE — BRIEF OP NOTE
HIATAL HERNIA REPAIR LAPAROSCOPIC, GASTRECTOMY LAPAROSCOPIC, ESOPHAGOGASTRODUODENOSCOPY  Progress Note    Iris Varma  1/10/2020    Pre-op Diagnosis:   Gastric outlet obstruction [K31.1]  Hiatal hernia [K44.9]       Post-Op Diagnosis Codes:     * Gastric outlet obstruction [K31.1]     * Hiatal hernia [K44.9]    Procedure/CPT® Codes:  MT LAP,ESOPHAGOGAST FUNDOPLASTY [23360]  MT UNLISTED LAPAROSCOPY PROC,INTESTINE [85321]  MT ESOPHAGOGASTRODUODENOSCOPY TRANSORAL DIAGNOSTIC [38252]    Procedure(s):  HIATAL HERNIA REPAIR LAPAROSCOPIC  GASTRECTOMY LAPAROSCOPIC  ESOPHAGOGASTRODUODENOSCOPY    Surgeon(s):  Jose Tyler MD    Anesthesia: General with Block    Staff:   Circulator: Ana Luisa Maurer RN; Quiana Torres RN; Kierra Abdul RN  Scrub Person: Marci Barr; Sangita Ryan  Nursing Assistant: Mally Major; Samara Saucedo    Estimated Blood Loss: 100ml    Urine Voided: * No values recorded between 1/10/2020  9:17 AM and 1/10/2020 11:42 AM *    Specimens:                Specimens     ID Source Type Tests Collected By Collected At Frozen?      A Stomach Tissue · TISSUE PATHOLOGY EXAM   Jose Tyler MD 1/10/20 1116      Description: subtotal gastrectomy for permanent     This specimen was not marked as sent.                Drains: * No LDAs found *    Findings: Dense nydia-hiatal adhesions.  Very small subtle recurrent hiatal hernia.  Excessive redundant cardia encountered and excised.  Post procedure EGD unremarkable post LSG.    Complications: none      Jose Tyler MD     Date: 1/10/2020  Time: 11:42 AM

## 2020-01-10 NOTE — ANESTHESIA POSTPROCEDURE EVALUATION
Patient: Iris Varma    Procedure Summary     Date:  01/10/20 Room / Location:   [facility] /  [facility]    Anesthesia Start:  0917 Anesthesia Stop:  1152    Procedures:       HIATAL HERNIA REPAIR LAPAROSCOPIC (N/A Abdomen)      GASTRECTOMY LAPAROSCOPIC (N/A Abdomen)      ESOPHAGOGASTRODUODENOSCOPY (N/A Esophagus) Diagnosis:       Gastric outlet obstruction      Hiatal hernia      (Gastric outlet obstruction [K31.1])      (Hiatal hernia [K44.9])    Surgeon:  Jose Tyler MD Provider:  Corin Albarran MD    Anesthesia Type:  general with block ASA Status:  3          Anesthesia Type: general with block    Vitals  Vitals Value Taken Time   BP     Temp     Pulse     Resp     SpO2 92 % 1/10/2020 11:51 AM   Vitals shown include unvalidated device data.        Post Anesthesia Care and Evaluation    Patient location during evaluation: PACU  Patient participation: complete - patient participated  Level of consciousness: responsive to verbal stimuli  Pain score: 0  Pain management: adequate  Airway patency: patent  Anesthetic complications: No anesthetic complications  PONV Status: none  Cardiovascular status: hemodynamically stable and acceptable  Respiratory status: nonlabored ventilation, acceptable and nasal cannula  Hydration status: acceptable      
No cyanosis, clubbing or edema

## 2020-01-10 NOTE — PLAN OF CARE
Problem: Patient Care Overview  Goal: Plan of Care Review  Outcome: Ongoing (interventions implemented as appropriate)  Flowsheets (Taken 1/10/2020 1323)  Progress: no change  Plan of Care Reviewed With: patient  Outcome Summary: to floor, family at bedside, denies nausea and pain at this time     Problem: Patient Care Overview  Goal: Individualization and Mutuality  Outcome: Ongoing (interventions implemented as appropriate)  Flowsheets (Taken 1/10/2020 1323)  Patient Specific Goals (Include Timeframe): monitor pain q2h and prn     Problem: Bariatric Surgery (Adult,Pediatric)  Goal: Signs and Symptoms of Listed Potential Problems Will be Absent, Minimized or Managed (Bariatric Surgery)  Outcome: Ongoing (interventions implemented as appropriate)  Flowsheets (Taken 1/10/2020 1323)  Problems Assessed (Bariatric Surgery): all  Problems Present (Bariatric Surgery): none     Problem: Bariatric Surgery (Adult,Pediatric)  Goal: Anesthesia/Sedation Recovery  Outcome: Outcome(s) achieved

## 2020-01-10 NOTE — ANESTHESIA PROCEDURE NOTES
Airway  Urgency: elective    Date/Time: 1/10/2020 9:29 AM  Difficult airway    General Information and Staff    Patient location during procedure: OR    Indications and Patient Condition  Indications for airway management: airway protection    Preoxygenated: yes  Mask difficulty assessment: 1 - vent by mask    Final Airway Details  Final airway type: endotracheal airway      Successful airway: ETT  Cuffed: yes   Successful intubation technique: direct laryngoscopy  Facilitating devices/methods: intubating stylet and Bougie  Endotracheal tube insertion site: oral  Blade: Lopez  Blade size: 2  ETT size (mm): 6.5  Cormack-Lehane Classification: grade III - view of epiglottis only  Placement verified by: chest auscultation and capnometry   Measured from: lips  ETT/EBT  to lips (cm): 19  Number of attempts at approach: 2  Assessment: lips, teeth, and gum same as pre-op and atraumatic intubation

## 2020-01-10 NOTE — OP NOTE
Preoperative Diagnosis:                                 Recurrent Hiatal hernia w LORRAINE, possible partial gastric outlet obstruction     Postoperative Diagnosis:                                Same     Procedure:                                                   Laparoscopic hiatal hernia repair (not paraesophageal)                                                                          Laparoscopic partial gastrectomy over a 36 Fr Bougie dilator                                                                              Surgeon:                                                       SHYANNE Tyler MD     Anesthesia:                                                   GETA     EBL:                                                              100 cc     Fluids:                                                           Crystalloid     Specimens:                                                    Partial gastrectomy     Drains:                                                           None     Counts:                                                          Correct     Complications:                                               None    Findings:  Dense nydia-hiatal adhesions.  Very small subtle recurrent hiatal hernia.  Excessive redundant cardia encountered and excised.  Post procedure EGD unremarkable post LSG.     Indications:   This is a 54 year-old previously morbidly obese black female known to me status post laparoscopic sleeve gastrectomy and hiatal hernia repair in August 2013, laparoscopic cholecystectomy with EGD in September 2018.  At that time she was noted to have a small amount of redundant cardia.  She presented complaining of a two-month history of severe worsening reflux, hoarseness, and aspiration pneumonia.  Upper GI shows an outpouching of the proximal sleeve and moderate reflux to the level of the thoracic inlet.  EGD shows a possible recurrent hiatal hernia versus outpouching of the proximal sleeve  causing a partial gastric outlet obstruction.  Please see our office notes.  R/B/A Rx discussed and wishes to proceed with lap recurrent hiatal hernia repair and possible laparoscopic proximal diverting Diogenes-en-Y gastrojejunostomy.     Operative technique:      The patient was brought to the operating room, and placed supine upon the operating room table.  SCD hose were placed, she underwent uneventful general endotracheal anesthesia per the anesthesiology staff, the anesthesiology staff performed a tap block, she received IV Ancef and subcutaneous Lovenox, and her abdomen was prepped and draped with chloroprep in a sterile fashion, an Ioban was used as well, a Sales catheter was not placed.     The peritoneal cavity was entered in the upper abdomen to the left of midline using a 5 mm trocar and an Optiview technique and the abdomen was insufflated to a pressure of 15 mmHg with CO2 gas.  Exploratory laparoscopy revealed no evidence of injury from the entrance technique, omental adhesions along the midline from the proximal falciform extending to the mid pelvis.  Under direct visualization 2 additional 5 mm trochars were placed on the left side and extensive adhesio lysis was carried out with the LigaSure device taking down all the omental adhesions to the midline.  Noted deep in the right lower quadrant was a small bowel loop densely adhesed to the lateral abdominal sidewall and this was left undisturbed.  Under direct visualization a 5 mm trocar was placed in the right upper quadrant.  Through a stab incision in the epigastrium a Vera retractor was used to elevate the left lobe of the liver.  The liver had a normal appearance.  Dense adhesions of the undersurface of the left lobe of the liver to the lesser omentum and sleeve were taken down exposing the hiatus.  Some liver capsular oozing responded to cautery.  There was not an obvious recurrent hiatal hernia from the anterior view.  Extensive  photodocumentation obtained throughout the case.  The sleeve appeared to be resting nicely with omental adhesions over the lower two thirds.  The omental adhesions were lysed exposing the sleeve and it still seem to be resting fairly nicely.  Omental adhesions to the lateral superior sleeve were taken down -more than expected chronic scarring in the area but no evidence of infection or abscess.  This proceeded proximally until the left niko could be exposed.  There did appear to be redundant cardia.  There was a lot of dense scarring but there did appear to be a small recurrent hiatal hernia.  The camera trocar and the left mid abdominal trocar were changed out under direct visualization to 11 mm fascial splitting trochars.  The hernia sac was incised along the base of the left niko and this was extended up and across the phrenoesophageal membrane.  Dense adhesions of the caudate lobe to the right niko were taken down, once again a small amount of capsular bleeding which responded to cautery and FloSeal.  Photodocumentation of the right niko was obtained, not an obvious recurrent hernia from this view.  There was not a replaced hepatic vessel.  The hernia sac was incised along the base of the right niko and this was extended up and across the phrenoesophageal membrane.  The hernia sac and it's contents were dissected out of the mediastinum and reduced below the level of the crura.    There was not a paraesophageal component.  The GE junction was then lengthened to well below the level of the crura by dissecting dense scar tissue well into the mediastinum.    This was a more technically challenging than usual recurrent hiatal hernia repair.  A 1/2 inch Penrose drain was used temporarily for esophageal retraction.  The anterior and posterior vagus nerves were preserved.    The crura were dissected to their meeting point inferiorly.  The hiatal hernia repair was then performed posteriorly using three interrupted 0-silk  suture with good result.  Photodocumentation before and after repair was obtained.  The penrose drain was removed.    There was clearly an excessive amount of redundant cardia and this was photo documented.  I felt the best course of action would be to excise this redundant cardia which I felt was likely the cause of her partial gastric outlet obstruction.  The angularis was marked 5 mm away from the esophagus with a Kitner saturated with a marking pen.   Additional adhesions to the lateral sleeve were taken down to a few centimeters proximal to the pylorus.  The original entrance site trocar was changed out to a 15 mm trocar.  The anesthesiology staff passed a 36 [place]ian blunt-tipped bougie dilator which was manipulated into the antrum.  The excess cardia was excised to the level of the previous marking at the angularis with a single firing of the GAMEVIL electronic articulating stapler with a 60 mm black load and dual absorbable attached strips.  The small partial gastrectomy was retrieved through the 15 mm trocar site incision and sent a specimen to pathology.  The sleeve appeared to be resting very nicely at this point and photodocumentation was obtained.    The sleeve was submerged under saline.  I performed upper endoscopy advancing the standard flexible endoscope into the posterior pharynx and intubating the esophagus.  The esophagus did have spasms and tortuosity but no distention or retained secretions.  On reaching the distal esophagus no visible hiatal hernia Baig's esophagus or gross esophagitis.  Z line 38 cm.  Photodocumentation obtained.  The gastric sleeve was entered, it was now a nice uniform tube.  No redundant cardia.  No twisting or narrowing at the angularis.  No obvious gastritis.  The pylorus was not and spasm or deformed and this was photo documented.  The endoscope was advanced into the first portion of the duodenum which was unremarkable.  No air bubbles or leak seen.  No  bleeding from the staple line.  No ischemia.  Overall I felt endoscopically that her partial gastric outlet obstruction had been successfully addressed.  The endoscope was slowly withdrawn no other abnormalities noted.    Irrigation fluid was suctioned free.  The sleeve appeared to rest nicely laparoscopically as well and did not want to kink.  At this point I felt that the risk of performing a gastrojejunostomy outweighed the benefit given the laparoscopic and endoscopic appearance and that it seemed that the main issue was excessive redundant cardia which had been addressed.  Hemostasis had been excellent for over an hour and remaining FloSeal was placed around the hiatus.  The Vera retractor was removed.   Fascia at the 15 mm trocar site incision was closed with a horizontal mattress 0 Vicryl suture placed with a suture passer under direct visualization and tying the knot extracorporeally.  Remaining trocars were removed under direct visualization, no bleeding noted from their sites. Skin in each incision was closed using 3-0 Monocryl plus in an interrupted subcuticular stitch followed by skin-a-fix.      The patient tolerated the procedure well without complication, was taken to the recovery room in stable condition.

## 2020-01-10 NOTE — ANESTHESIA PREPROCEDURE EVALUATION
Anesthesia Evaluation     Patient summary reviewed and Nursing notes reviewed   no history of anesthetic complications:  NPO Solid Status: > 8 hours  NPO Liquid Status: > 2 hours           Airway   Mallampati: II  TM distance: >3 FB  Neck ROM: full  No difficulty expected  Dental - normal exam     Pulmonary - negative pulmonary ROS and normal exam   Cardiovascular - normal exam  Exercise tolerance: good (4-7 METS)    ECG reviewed    (+) hypertension, hyperlipidemia,   (-) valvular problems/murmurs, dysrhythmias, angina, CONN      Neuro/Psych  (+) psychiatric history Anxiety and Depression,     GI/Hepatic/Renal/Endo    (+) obesity,  hiatal hernia, GERD well controlled,  renal disease CRI, diabetes mellitus,     Musculoskeletal     Abdominal    Substance History      OB/GYN          Other   arthritis,                    Anesthesia Plan    ASA 3     general with block   (Propofol gtt with VA  TAP blocks)  intravenous induction     Anesthetic plan, all risks, benefits, and alternatives have been provided, discussed and informed consent has been obtained with: patient.    Plan discussed with CRNA.

## 2020-01-11 ENCOUNTER — APPOINTMENT (OUTPATIENT)
Dept: GENERAL RADIOLOGY | Facility: HOSPITAL | Age: 55
End: 2020-01-11

## 2020-01-11 VITALS
RESPIRATION RATE: 16 BRPM | SYSTOLIC BLOOD PRESSURE: 124 MMHG | OXYGEN SATURATION: 95 % | TEMPERATURE: 98.3 F | BODY MASS INDEX: 28.55 KG/M2 | HEIGHT: 58 IN | WEIGHT: 136 LBS | DIASTOLIC BLOOD PRESSURE: 89 MMHG | HEART RATE: 70 BPM

## 2020-01-11 LAB
ALBUMIN SERPL-MCNC: 3.3 G/DL (ref 3.5–5.2)
ALBUMIN/GLOB SERPL: 1.1 G/DL
ALP SERPL-CCNC: 79 U/L (ref 39–117)
ALT SERPL W P-5'-P-CCNC: 54 U/L (ref 1–33)
ANION GAP SERPL CALCULATED.3IONS-SCNC: 11 MMOL/L (ref 5–15)
AST SERPL-CCNC: 78 U/L (ref 1–32)
BASOPHILS # BLD AUTO: 0.02 10*3/MM3 (ref 0–0.2)
BASOPHILS NFR BLD AUTO: 0.1 % (ref 0–1.5)
BILIRUB SERPL-MCNC: 0.4 MG/DL (ref 0.2–1.2)
BUN BLD-MCNC: 14 MG/DL (ref 6–20)
BUN/CREAT SERPL: 15.2 (ref 7–25)
CALCIUM SPEC-SCNC: 8.6 MG/DL (ref 8.6–10.5)
CHLORIDE SERPL-SCNC: 95 MMOL/L (ref 98–107)
CO2 SERPL-SCNC: 28 MMOL/L (ref 22–29)
CREAT BLD-MCNC: 0.92 MG/DL (ref 0.57–1)
DEPRECATED RDW RBC AUTO: 43.5 FL (ref 37–54)
EOSINOPHIL # BLD AUTO: 0 10*3/MM3 (ref 0–0.4)
EOSINOPHIL NFR BLD AUTO: 0 % (ref 0.3–6.2)
ERYTHROCYTE [DISTWIDTH] IN BLOOD BY AUTOMATED COUNT: 14.1 % (ref 12.3–15.4)
GFR SERPL CREATININE-BSD FRML MDRD: 63 ML/MIN/1.73
GLOBULIN UR ELPH-MCNC: 3.1 GM/DL
GLUCOSE BLD-MCNC: 118 MG/DL (ref 65–99)
HCT VFR BLD AUTO: 32.4 % (ref 34–46.6)
HGB BLD-MCNC: 11.4 G/DL (ref 12–15.9)
IMM GRANULOCYTES # BLD AUTO: 0.11 10*3/MM3 (ref 0–0.05)
IMM GRANULOCYTES NFR BLD AUTO: 0.7 % (ref 0–0.5)
IRON 24H UR-MRATE: 27 MCG/DL (ref 37–145)
LYMPHOCYTES # BLD AUTO: 1.47 10*3/MM3 (ref 0.7–3.1)
LYMPHOCYTES NFR BLD AUTO: 8.9 % (ref 19.6–45.3)
MCH RBC QN AUTO: 29.9 PG (ref 26.6–33)
MCHC RBC AUTO-ENTMCNC: 35.2 G/DL (ref 31.5–35.7)
MCV RBC AUTO: 85 FL (ref 79–97)
MONOCYTES # BLD AUTO: 0.96 10*3/MM3 (ref 0.1–0.9)
MONOCYTES NFR BLD AUTO: 5.8 % (ref 5–12)
NEUTROPHILS # BLD AUTO: 13.9 10*3/MM3 (ref 1.7–7)
NEUTROPHILS NFR BLD AUTO: 84.5 % (ref 42.7–76)
NRBC BLD AUTO-RTO: 0 /100 WBC (ref 0–0.2)
PLATELET # BLD AUTO: 315 10*3/MM3 (ref 140–450)
PMV BLD AUTO: 11.2 FL (ref 6–12)
POTASSIUM BLD-SCNC: 3.1 MMOL/L (ref 3.5–5.2)
PROT SERPL-MCNC: 6.4 G/DL (ref 6–8.5)
RBC # BLD AUTO: 3.81 10*6/MM3 (ref 3.77–5.28)
SODIUM BLD-SCNC: 134 MMOL/L (ref 136–145)
WBC NRBC COR # BLD: 16.46 10*3/MM3 (ref 3.4–10.8)

## 2020-01-11 PROCEDURE — 25010000002 ENOXAPARIN PER 10 MG: Performed by: SURGERY

## 2020-01-11 PROCEDURE — 25010000002 THIAMINE PER 100 MG: Performed by: SURGERY

## 2020-01-11 PROCEDURE — 99024 POSTOP FOLLOW-UP VISIT: CPT | Performed by: SURGERY

## 2020-01-11 PROCEDURE — 83540 ASSAY OF IRON: CPT | Performed by: SURGERY

## 2020-01-11 PROCEDURE — 25010000002 CYANOCOBALAMIN PER 1000 MCG: Performed by: SURGERY

## 2020-01-11 PROCEDURE — 74240 X-RAY XM UPR GI TRC 1CNTRST: CPT

## 2020-01-11 PROCEDURE — 25010000003 CEFAZOLIN IN DEXTROSE 2-4 GM/100ML-% SOLUTION: Performed by: SURGERY

## 2020-01-11 PROCEDURE — 25010000002 ONDANSETRON PER 1 MG: Performed by: SURGERY

## 2020-01-11 PROCEDURE — 0 DIATRIZOATE MEGLUMINE & SODIUM PER 1 ML: Performed by: SURGERY

## 2020-01-11 PROCEDURE — 85025 COMPLETE CBC W/AUTO DIFF WBC: CPT | Performed by: SURGERY

## 2020-01-11 PROCEDURE — 80053 COMPREHEN METABOLIC PANEL: CPT | Performed by: SURGERY

## 2020-01-11 PROCEDURE — 25010000002 NA FERRIC GLUC CPLX PER 12.5 MG: Performed by: SURGERY

## 2020-01-11 RX ORDER — POTASSIUM CHLORIDE 29.8 MG/ML
20 INJECTION INTRAVENOUS
Status: DISCONTINUED | OUTPATIENT
Start: 2020-01-11 | End: 2020-01-11 | Stop reason: HOSPADM

## 2020-01-11 RX ORDER — POTASSIUM CHLORIDE 1500 MG/1
40 TABLET, FILM COATED, EXTENDED RELEASE ORAL DAILY
Qty: 10 TABLET | Refills: 0 | Status: SHIPPED | OUTPATIENT
Start: 2020-01-11 | End: 2020-01-16

## 2020-01-11 RX ORDER — HYDROCODONE BITARTRATE AND ACETAMINOPHEN 7.5; 325 MG/1; MG/1
1 TABLET ORAL EVERY 6 HOURS PRN
Qty: 10 TABLET | Refills: 0 | Status: SHIPPED | OUTPATIENT
Start: 2020-01-11 | End: 2020-01-17

## 2020-01-11 RX ORDER — POTASSIUM CHLORIDE 750 MG/1
40 CAPSULE, EXTENDED RELEASE ORAL AS NEEDED
Status: DISCONTINUED | OUTPATIENT
Start: 2020-01-11 | End: 2020-01-11 | Stop reason: HOSPADM

## 2020-01-11 RX ORDER — ONDANSETRON 4 MG/1
4 TABLET, ORALLY DISINTEGRATING ORAL EVERY 8 HOURS PRN
Qty: 10 TABLET | Refills: 0 | Status: SHIPPED | OUTPATIENT
Start: 2020-01-11 | End: 2021-12-28

## 2020-01-11 RX ORDER — POTASSIUM CHLORIDE 1.5 G/1.77G
40 POWDER, FOR SOLUTION ORAL AS NEEDED
Status: DISCONTINUED | OUTPATIENT
Start: 2020-01-11 | End: 2020-01-11 | Stop reason: HOSPADM

## 2020-01-11 RX ADMIN — CYANOCOBALAMIN 1000 MCG: 1000 INJECTION, SOLUTION INTRAMUSCULAR; SUBCUTANEOUS at 08:34

## 2020-01-11 RX ADMIN — HYDROCODONE BITARTRATE AND ACETAMINOPHEN 1 TABLET: 7.5; 325 TABLET ORAL at 01:15

## 2020-01-11 RX ADMIN — THIAMINE HYDROCHLORIDE 250 ML/HR: 100 INJECTION, SOLUTION INTRAMUSCULAR; INTRAVENOUS at 06:15

## 2020-01-11 RX ADMIN — ESCITALOPRAM OXALATE 20 MG: 20 TABLET ORAL at 08:34

## 2020-01-11 RX ADMIN — ACETAMINOPHEN 1000 MG: 500 TABLET, FILM COATED ORAL at 08:34

## 2020-01-11 RX ADMIN — MINOCYCLINE HYDROCHLORIDE 100 MG: 50 CAPSULE ORAL at 08:34

## 2020-01-11 RX ADMIN — CEFAZOLIN SODIUM 2 G: 2 INJECTION, SOLUTION INTRAVENOUS at 01:16

## 2020-01-11 RX ADMIN — ONDANSETRON 4 MG: 2 INJECTION INTRAMUSCULAR; INTRAVENOUS at 01:15

## 2020-01-11 RX ADMIN — GABAPENTIN 100 MG: 100 CAPSULE ORAL at 08:34

## 2020-01-11 RX ADMIN — ENOXAPARIN SODIUM 40 MG: 100 INJECTION SUBCUTANEOUS at 08:35

## 2020-01-11 RX ADMIN — SODIUM CHLORIDE 125 MG: 9 INJECTION, SOLUTION INTRAVENOUS at 13:24

## 2020-01-11 RX ADMIN — POTASSIUM CHLORIDE 40 MEQ: 750 CAPSULE, EXTENDED RELEASE ORAL at 13:24

## 2020-01-11 RX ADMIN — CARVEDILOL 12.5 MG: 12.5 TABLET, FILM COATED ORAL at 08:34

## 2020-01-11 RX ADMIN — POTASSIUM CHLORIDE 40 MEQ: 750 CAPSULE, EXTENDED RELEASE ORAL at 08:34

## 2020-01-11 RX ADMIN — POTASSIUM CHLORIDE AND SODIUM CHLORIDE 125 ML/HR: 450; 150 INJECTION, SOLUTION INTRAVENOUS at 13:24

## 2020-01-11 RX ADMIN — PANTOPRAZOLE SODIUM 40 MG: 40 INJECTION, POWDER, FOR SOLUTION INTRAVENOUS at 06:16

## 2020-01-11 RX ADMIN — ONDANSETRON 4 MG: 2 INJECTION INTRAMUSCULAR; INTRAVENOUS at 06:16

## 2020-01-11 RX ADMIN — DIATRIZOATE MEGLUMINE AND DIATRIZOATE SODIUM 30 ML: 660; 100 LIQUID ORAL; RECTAL at 12:00

## 2020-01-11 RX ADMIN — HYDROCODONE BITARTRATE AND ACETAMINOPHEN 1 TABLET: 7.5; 325 TABLET ORAL at 13:51

## 2020-01-11 NOTE — PLAN OF CARE
Patient's VSS, UOP adequate.  No c/o pain or nausea throughout shift.  Demonstrated proper use of IS.  Ambulated in the bernard with stand by assist.  SCD's in place.  Son at bedside.  Will continue to monitor.

## 2020-01-11 NOTE — PLAN OF CARE
Problem: Patient Care Overview  Goal: Plan of Care Review  Outcome: Ongoing (interventions implemented as appropriate)  Flowsheets (Taken 1/11/2020 0907)  Progress: improving  Plan of Care Reviewed With: patient  Outcome Summary: doing well post surgery, going for UGI     Problem: Patient Care Overview  Goal: Individualization and Mutuality  Outcome: Ongoing (interventions implemented as appropriate)  Flowsheets (Taken 1/10/2020 1323)  Patient Specific Goals (Include Timeframe): monitor pain q2h and prn     Problem: Bariatric Surgery (Adult,Pediatric)  Goal: Signs and Symptoms of Listed Potential Problems Will be Absent, Minimized or Managed (Bariatric Surgery)  Outcome: Ongoing (interventions implemented as appropriate)  Flowsheets (Taken 1/11/2020 0088)  Problems Assessed (Bariatric Surgery): all  Problems Present (Bariatric Surgery): bowel motility decreased

## 2020-01-11 NOTE — PROGRESS NOTES
"Bariatric Surgery Progress Note:      Chief Complaint:  POD #1    Subjective     Interval History:  Doing well.  No complaints.  Pain controlled.  Denies N/V.  No fevers.  Ambulating.  Voiding.  IS 2000.  Feels like her preop symptoms of reflux and nausea have completely resolved.    Objective     Vital Signs  Blood pressure 124/89, pulse 70, temperature 98.3 °F (36.8 °C), temperature source Oral, resp. rate 16, height 147.3 cm (58\"), weight 61.7 kg (136 lb), SpO2 95 %.      Intake/Output Summary (Last 24 hours) at 1/11/2020 1413  Last data filed at 1/11/2020 1324  Gross per 24 hour   Intake 803 ml   Output 2400 ml   Net -1597 ml       Physical Exam:  General: Alert, NAD  Lungs: Clear  Heart: RRR  Abdomen: soft, appropriate, incisions okay  Extremities: warm, (+) SCDs       Labs:  Lab Results (last 24 hours)     Procedure Component Value Units Date/Time    Comprehensive Metabolic Panel [[id number]]  (Abnormal) Collected:  01/11/20 0526    Specimen:  Blood Updated:  01/11/20 0716     Glucose 118 mg/dL      BUN 14 mg/dL      Creatinine 0.92 mg/dL      Sodium 134 mmol/L      Potassium 3.1 mmol/L      Chloride 95 mmol/L      CO2 28.0 mmol/L      Calcium 8.6 mg/dL      Total Protein 6.4 g/dL      Albumin 3.30 g/dL      ALT (SGPT) 54 U/L      AST (SGOT) 78 U/L      Alkaline Phosphatase 79 U/L      Total Bilirubin 0.4 mg/dL      eGFR Non African Amer 63 mL/min/1.73      Globulin 3.1 gm/dL      A/G Ratio 1.1 g/dL      BUN/Creatinine Ratio 15.2     Anion Gap 11.0 mmol/L     Narrative:       GFR Normal >60  Chronic Kidney Disease <60  Kidney Failure <15      Iron [[id number]]  (Abnormal) Collected:  01/11/20 0526    Specimen:  Blood Updated:  01/11/20 0716     Iron 27 mcg/dL     CBC & Differential [[id number]] Collected:  01/11/20 0526    Specimen:  Blood Updated:  01/11/20 0701    Narrative:       The following orders were created for panel order CBC & Differential.  Procedure                               Abnormality         " Status                     ---------                               -----------         ------                     CBC Auto Differential[[id number]]        Abnormal            Final result                 Please view results for these tests on the individual orders.    CBC Auto Differential [[id number]]  (Abnormal) Collected:  01/11/20 0526    Specimen:  Blood Updated:  01/11/20 0701     WBC 16.46 10*3/mm3      RBC 3.81 10*6/mm3      Hemoglobin 11.4 g/dL      Hematocrit 32.4 %      MCV 85.0 fL      MCH 29.9 pg      MCHC 35.2 g/dL      RDW 14.1 %      RDW-SD 43.5 fl      MPV 11.2 fL      Platelets 315 10*3/mm3      Neutrophil % 84.5 %      Lymphocyte % 8.9 %      Monocyte % 5.8 %      Eosinophil % 0.0 %      Basophil % 0.1 %      Immature Grans % 0.7 %      Neutrophils, Absolute 13.90 10*3/mm3      Lymphocytes, Absolute 1.47 10*3/mm3      Monocytes, Absolute 0.96 10*3/mm3      Eosinophils, Absolute 0.00 10*3/mm3      Basophils, Absolute 0.02 10*3/mm3      Immature Grans, Absolute 0.11 10*3/mm3      nRBC 0.0 /100 WBC             Assessment/Plan     POD # 1 s/p HHR, partial gastrectomy.    Doing well.  UGI normal post-sleeve, images and report reviewed.  IV iron given for low Fe without evidence of bleeding on Lovenox.  KCl replaecd. Patient feels well and has met discharge criteria.  Will discharge home with follow up in 1 week with PA.  Rx given for Lortab, KCl 40 meq per day, PPI, Zofran.   Discharge instructions reviewed with patient and all questions answered.  Follow up with PCP in 1 weeks for potassium recheck.      01/11/20  2:13 PM  Sania Jaime MD

## 2020-01-13 RX ORDER — POTASSIUM CHLORIDE 1500 MG/1
TABLET, FILM COATED, EXTENDED RELEASE ORAL
Qty: 10 TABLET | Refills: 0 | OUTPATIENT
Start: 2020-01-13

## 2020-01-17 ENCOUNTER — OFFICE VISIT (OUTPATIENT)
Dept: BARIATRICS/WEIGHT MGMT | Facility: CLINIC | Age: 55
End: 2020-01-17

## 2020-01-17 VITALS
WEIGHT: 130.5 LBS | TEMPERATURE: 97.1 F | HEIGHT: 58 IN | DIASTOLIC BLOOD PRESSURE: 68 MMHG | OXYGEN SATURATION: 99 % | SYSTOLIC BLOOD PRESSURE: 120 MMHG | HEART RATE: 86 BPM | RESPIRATION RATE: 18 BRPM | BODY MASS INDEX: 27.39 KG/M2

## 2020-01-17 DIAGNOSIS — R53.83 FATIGUE, UNSPECIFIED TYPE: ICD-10-CM

## 2020-01-17 DIAGNOSIS — E87.6 HYPOKALEMIA: Primary | ICD-10-CM

## 2020-01-17 PROCEDURE — 99024 POSTOP FOLLOW-UP VISIT: CPT | Performed by: PHYSICIAN ASSISTANT

## 2020-01-17 NOTE — PROGRESS NOTES
[facility] Bariatric Surgery  [address]  [address]  [facility] [id number] [phone]      Patient Name:  Iris Varma.  :  1965      Reason for Visit:  POD #7 HHR/ partial gastrectomy      HPI:  Iris Varma is a 54 y.o. female s/p lap HHR/ partial gastrectomy by  on 1/10/20, previously s/p LSG/HHR 2013 G[facility]    Findings:  Dense nydia-hiatal adhesions.  Very small subtle recurrent hiatal hernia.  Excessive redundant cardia encountered and excised.  Post procedure EGD unremarkable post LSG.     Discharged POD#1- IV iron given, KCl replaced. Rx for lortab, KCl 40meq, PPI, zofran, f/u PCP in 1 week for Potassium recheck.     Doing well.  Has not required antiemetics. Has some abdominal soreness, was using pain meds only at night.  Has some left posterior chest wall pain with deep inspiration, denies SOA/ cough/ wheeze.Has been taking daily KCl supplement as Rx, has not seen PCP for f/u. Is not really able to tell symptom improvement from presurgery, although has no GI complaints and is tolerating PO intake well.  No other issues/concerns. Denies dysphagia, reflux, nausea, vomiting, pulmonary issues and fevers. Using IS.  Tolerating diet progression - on stage 2.  Getting 30+?g prot/day, isn't hungry and doesn't really want it.  Drinking <64fluid oz/day, water + shake + broth, again drinking without issue- just hasn't wanted it.  Not taking vimtains.   On Omeprazole .  Holding ASA , NSAIDs , Tramadol, Hormones, Diuretics , Steroids and Immunologics.  Ambulating.     Presurgery weight: 170 pounds.  Today's weight is 59.2 kg (130 lb 8 oz) pounds, today's  Body mass index is 27.27 kg/m²., and her weight loss since surgery is 40 pounds.       Past Medical History:   Diagnosis Date   • Acne     on Minocycline   • Allergic rhinitis    • Anxiety    • Arthritis    • Chronic back pain    • Depression    • Diabetes mellitus (CMS/HCC)    • Dyspnea on exertion    • Eczema     prn  steroid cream   • Elevated LFTs    • Fatigue    • GERD (gastroesophageal reflux disease)    • Helicobacter pylori (H. pylori) infection    • Hiatal hernia    • Hormone replacement therapy (HRT)     s/p TAHBSO   • Hyperlipidemia    • Hypertension    • Hypokalemia    • Insomnia    • Joint pain    • Obesity    • Peripheral edema     daily Maxzide   • Renal insufficiency    • Vitamin D deficiency    • Wears glasses      Past Surgical History:   Procedure Laterality Date   • BREAST BIOPSY Right 2012   •  SECTION  ,    • COLONOSCOPY     • ENDOSCOPY N/A 1/10/2020    Procedure: ESOPHAGOGASTRODUODENOSCOPY;  Surgeon: Jose Tyler MD;  Location:  [facility];  Service: General   • GASTRECTOMY N/A 1/10/2020    Procedure: GASTRECTOMY LAPAROSCOPIC;  Surgeon: Jose Tyler MD;  Location:  [facility];  Service: General   • GASTRIC SLEEVE LAPAROSCOPIC  2013    HHR   [facility]   • HIATAL HERNIA REPAIR N/A 1/10/2020    Procedure: HIATAL HERNIA REPAIR LAPAROSCOPIC;  Surgeon: Jose Tyler MD;  Location:  [facility];  Service: General   • HYSTERECTOMY      w/ oophorectomy   • LAPAROSCOPIC CHOLECYSTECTOMY  2018    w/ PAIGE by Dr. Tyler   • TONSILLECTOMY       Outpatient Medications Marked as Taking for the 20 encounter (Office Visit) with Priya Granger PA-C   Medication Sig Dispense Refill   • carvedilol (COREG) 12.5 MG tablet Take 12.5 mg by mouth 2 (Two) Times a Day With Meals.     • escitalopram (LEXAPRO) 20 MG tablet Take 20 mg by mouth Daily.     • minocycline (MINOCIN,DYNACIN) 100 MG capsule Take 100 mg by mouth Daily.     • omeprazole (priLOSEC) 40 MG capsule Take 1 capsule by mouth Daily. 30 capsule 0   • triamcinolone (KENALOG) 0.1 % ointment Apply  topically to the appropriate area as directed As Needed.       Allergies   Allergen Reactions   • Lisinopril Swelling     Lips       Social History     Socioeconomic History   • Marital status:      Spouse name: Not on file  "  • Number of children: Not on file   • Years of education: Not on file   • Highest education level: Not on file   Tobacco Use   • Smoking status: Never Smoker   • Smokeless tobacco: Never Used   Substance and Sexual Activity   • Alcohol use: No   • Drug use: No   • Sexual activity: Defer       /68 (BP Location: Left arm, Patient Position: Sitting, Cuff Size: Large Adult)   Pulse 86   Temp 97.1 °F (36.2 °C) (Temporal)   Resp 18   Ht 147.3 cm (58\")   Wt 59.2 kg (130 lb 8 oz)   SpO2 99%   BMI 27.27 kg/m²   Physical Exam   Constitutional: She is oriented to person, place, and time. She appears well-developed and well-nourished.   HENT:   Head: Normocephalic and atraumatic.   Cardiovascular: Normal rate, regular rhythm and normal heart sounds.   Pulmonary/Chest: Effort normal. No respiratory distress. She has no wheezes.   Minimally diminished LLL   Abdominal: Soft. Bowel sounds are normal. She exhibits no distension. There is no tenderness.   Incisions healing well   Neurological: She is alert and oriented to person, place, and time.   Skin: Skin is warm and dry.   Psychiatric: She has a normal mood and affect. Her behavior is normal. Judgment and thought content normal.         Assessment:   POD #7  s/p lap HHR/ partial gastrectomy by  on 1/10/20, previously s/p LSG/HHR 8/2013 [facility]    ICD-10-CM ICD-9-CM   1. Hypokalemia E87.6 276.8   2. Fatigue, unspecified type R53.83 780.79           Plan:  Doing well. Discussed aggressive pulm toilet/ IS, let us know if worsens.  WIll check labs with hypokalemia on supplements, share results with PCP for further management. Continue to advance diet per manual.  Increase protein intake to 100g/day.  Increase exercise/activity as tolerated.  Reviewed lifting restrictions, nothing >25 lbs x 2 more weeks. Continue PPI.  Continue to avoid ASA/NSAIDs/tramadol/tobacco x 6 weeks postop, steroids x 8 weeks postop.  Call w/ problems/concerns.    The patient was instructed " to follow up in 3 weeks, sooner if needed.

## 2020-01-18 LAB
BASOPHILS # BLD AUTO: 0 X10E3/UL (ref 0–0.2)
BASOPHILS NFR BLD AUTO: 0 %
EOSINOPHIL # BLD AUTO: 0.4 X10E3/UL (ref 0–0.4)
EOSINOPHIL NFR BLD AUTO: 3 %
ERYTHROCYTE [DISTWIDTH] IN BLOOD BY AUTOMATED COUNT: 15.3 % (ref 11.7–15.4)
HCT VFR BLD AUTO: 36.7 % (ref 34–46.6)
HGB BLD-MCNC: 12.4 G/DL (ref 11.1–15.9)
IMM GRANULOCYTES # BLD AUTO: 0 X10E3/UL (ref 0–0.1)
IMM GRANULOCYTES NFR BLD AUTO: 0 %
LYMPHOCYTES # BLD AUTO: 2.8 X10E3/UL (ref 0.7–3.1)
LYMPHOCYTES NFR BLD AUTO: 22 %
MCH RBC QN AUTO: 28.9 PG (ref 26.6–33)
MCHC RBC AUTO-ENTMCNC: 33.8 G/DL (ref 31.5–35.7)
MCV RBC AUTO: 86 FL (ref 79–97)
MONOCYTES # BLD AUTO: 1 X10E3/UL (ref 0.1–0.9)
MONOCYTES NFR BLD AUTO: 8 %
NEUTROPHILS # BLD AUTO: 8.4 X10E3/UL (ref 1.4–7)
NEUTROPHILS NFR BLD AUTO: 67 %
PLATELET # BLD AUTO: 474 X10E3/UL (ref 150–450)
RBC # BLD AUTO: 4.29 X10E6/UL (ref 3.77–5.28)
WBC # BLD AUTO: 12.7 X10E3/UL (ref 3.4–10.8)

## 2020-01-22 ENCOUNTER — RESULTS ENCOUNTER (OUTPATIENT)
Dept: BARIATRICS/WEIGHT MGMT | Facility: CLINIC | Age: 55
End: 2020-01-22

## 2020-01-22 DIAGNOSIS — E87.6 HYPOKALEMIA: ICD-10-CM

## 2020-01-22 DIAGNOSIS — R53.83 FATIGUE, UNSPECIFIED TYPE: ICD-10-CM

## 2020-02-07 ENCOUNTER — OFFICE VISIT (OUTPATIENT)
Dept: BARIATRICS/WEIGHT MGMT | Facility: CLINIC | Age: 55
End: 2020-02-07

## 2020-02-07 VITALS
OXYGEN SATURATION: 99 % | HEART RATE: 76 BPM | DIASTOLIC BLOOD PRESSURE: 70 MMHG | WEIGHT: 128.5 LBS | BODY MASS INDEX: 26.97 KG/M2 | SYSTOLIC BLOOD PRESSURE: 110 MMHG | TEMPERATURE: 96.4 F | RESPIRATION RATE: 18 BRPM | HEIGHT: 58 IN

## 2020-02-07 DIAGNOSIS — Z13.0 SCREENING, IRON DEFICIENCY ANEMIA: ICD-10-CM

## 2020-02-07 DIAGNOSIS — K21.9 GASTROESOPHAGEAL REFLUX DISEASE, ESOPHAGITIS PRESENCE NOT SPECIFIED: ICD-10-CM

## 2020-02-07 DIAGNOSIS — Z13.21 MALNUTRITION SCREEN: ICD-10-CM

## 2020-02-07 DIAGNOSIS — R53.83 FATIGUE, UNSPECIFIED TYPE: Primary | ICD-10-CM

## 2020-02-07 DIAGNOSIS — K91.2 POSTGASTRECTOMY MALABSORPTION: ICD-10-CM

## 2020-02-07 DIAGNOSIS — K31.1 GASTRIC OUTLET OBSTRUCTION: ICD-10-CM

## 2020-02-07 DIAGNOSIS — Z90.3 POSTGASTRECTOMY MALABSORPTION: ICD-10-CM

## 2020-02-07 DIAGNOSIS — E55.9 HYPOVITAMINOSIS D: ICD-10-CM

## 2020-02-07 DIAGNOSIS — Z98.84 STATUS POST BARIATRIC SURGERY: ICD-10-CM

## 2020-02-07 DIAGNOSIS — K44.9 HIATAL HERNIA: ICD-10-CM

## 2020-02-07 PROCEDURE — 99024 POSTOP FOLLOW-UP VISIT: CPT | Performed by: SURGERY

## 2020-02-07 NOTE — PROGRESS NOTES
[facility] Bariatric Surgery  [address]  [address]  [facility] [address]  [phone]      Patient Name:  Iris Varma.  :  1965      Date of Visit: 2020      Reason for Visit:  1 month postop    HPI:  Iris Varma is a 54 y.o. female s/p  HHR/ partial gastrectomy by  on 1/10/20, previously s/p LSG/HHR 2013 [facility]    Some intermittent diarrhea vs. Constipation.    Her symptoms (reflux, dysphagia) from before surgery are gone.    Feels very full all the time.  Eating mostly chicken noodle soup with the chicken taken out.  Denies dysphagia, nausea, vomiting and abdominal pain.  Tolerating diet progression - on stage 4.  Getting 40 g prot/day.  Sometime takes her vitamin D, MVI pills.  On Omeprazole .  Still holding ASA , NSAIDs  and Steroids.  Exercise: none     Presurgery weight:  170 pounds. Today's weight is 58.3 kg (128 lb 8 oz) pounds, today's Body mass index is 26.86 kg/m²., and her weight loss since surgery is 42 pounds.       Past Medical History:   Diagnosis Date   • Acne     on Minocycline   • Allergic rhinitis    • Anxiety    • Arthritis    • Chronic back pain    • Depression    • Diabetes mellitus (CMS/HCC)    • Dyspnea on exertion    • Eczema     prn steroid cream   • Elevated LFTs    • Fatigue    • GERD (gastroesophageal reflux disease)    • Helicobacter pylori (H. pylori) infection    • Hiatal hernia    • Hormone replacement therapy (HRT)     s/p TAHBSO   • Hyperlipidemia    • Hypertension    • Hypokalemia    • Insomnia    • Joint pain    • Obesity    • Peripheral edema     daily Maxzide   • Renal insufficiency    • Vitamin D deficiency    • Wears glasses      Past Surgical History:   Procedure Laterality Date   • BREAST BIOPSY Right    •  SECTION  ,    • COLONOSCOPY  2017   • ENDOSCOPY N/A 1/10/2020    Procedure: ESOPHAGOGASTRODUODENOSCOPY;  Surgeon: Jose Tyler MD;  Location: [facility];  Service: General   • GASTRECTOMY  N/A 1/10/2020    Procedure: GASTRECTOMY LAPAROSCOPIC;  Surgeon: Jose Tyler MD;  Location:  [facility];  Service: General   • GASTRIC SLEEVE LAPAROSCOPIC  08/19/2013    HHR   [facility]   • HIATAL HERNIA REPAIR N/A 1/10/2020    Procedure: HIATAL HERNIA REPAIR LAPAROSCOPIC;  Surgeon: Jose Tyler MD;  Location:  [facility];  Service: General   • HYSTERECTOMY  2004    w/ oophorectomy   • LAPAROSCOPIC CHOLECYSTECTOMY  09/2018    w/ PAIGE by Dr. Tyler   • TONSILLECTOMY  1997     Outpatient Medications Marked as Taking for the 2/7/20 encounter (Office Visit) with Sania Jaime MD   Medication Sig Dispense Refill   • carvedilol (COREG) 12.5 MG tablet Take 12.5 mg by mouth 2 (Two) Times a Day With Meals.     • escitalopram (LEXAPRO) 20 MG tablet Take 20 mg by mouth Daily.     • minocycline (MINOCIN,DYNACIN) 100 MG capsule Take 100 mg by mouth Daily.     • omeprazole (priLOSEC) 40 MG capsule Take 1 capsule by mouth Daily. 30 capsule 0   • ondansetron ODT (ZOFRAN ODT) 4 MG disintegrating tablet Take 1 tablet by mouth Every 8 (Eight) Hours As Needed for Nausea or Vomiting. 10 tablet 0   • promethazine (PHENERGAN) 25 MG tablet Take 0.5 tablets by mouth Every 6 (Six) Hours As Needed for Nausea. 30 tablet 0   • triamcinolone (KENALOG) 0.1 % ointment Apply  topically to the appropriate area as directed As Needed.       Allergies   Allergen Reactions   • Lisinopril Swelling     Lips       Social History     Socioeconomic History   • Marital status:      Spouse name: Not on file   • Number of children: Not on file   • Years of education: Not on file   • Highest education level: Not on file   Tobacco Use   • Smoking status: Never Smoker   • Smokeless tobacco: Never Used   Substance and Sexual Activity   • Alcohol use: No   • Drug use: No   • Sexual activity: Defer       /70 (BP Location: Left arm, Patient Position: Sitting, Cuff Size: Adult)   Pulse 76   Temp 96.4 °F (35.8 °C) (Temporal)   Resp 18   Ht  "147.3 cm (58\")   Wt 58.3 kg (128 lb 8 oz)   SpO2 99%   BMI 26.86 kg/m²     Physical Exam   Constitutional: She is oriented to person, place, and time. She appears well-developed and well-nourished. No distress.   HENT:   Head: Normocephalic and atraumatic.   Mouth/Throat: No oropharyngeal exudate.   Eyes: Pupils are equal, round, and reactive to light. Conjunctivae and EOM are normal.   Pulmonary/Chest: Effort normal. No respiratory distress.   Abdominal: Soft. She exhibits no distension.   Incisions healed--slightly distended abdomen.   Neurological: She is alert and oriented to person, place, and time. No cranial nerve deficit.   Skin: Skin is warm and dry. She is not diaphoretic. No pallor.   Psychiatric: She has a normal mood and affect. Her behavior is normal. Thought content normal.         Assessment:  1 month s/p  HHR/ partial gastrectomy by  on 1/10/20, previously s/p LSG/HHR 8/2013 [facility]    Plan:  Doing well.  Continue to advance diet per manual.  Continue protein >70g/day.  Encouraged good food choices - high protein, low carb.  Continue routine exercise.  Routine bariatric labs ordered.  Continue vitamins w/ adjustments pending lab results.  Call w/ problems/concerns.    Increase protein, add fiber to diet to normalize stools.      The patient was instructed to follow up in 2 months, sooner if needed.     Sania Jaime MD    "

## 2020-02-12 LAB
25(OH)D3+25(OH)D2 SERPL-MCNC: 15.7 NG/ML (ref 30–100)
ALBUMIN SERPL-MCNC: 4.1 G/DL (ref 3.8–4.9)
ALBUMIN/GLOB SERPL: 1.5 {RATIO} (ref 1.2–2.2)
ALP SERPL-CCNC: 81 IU/L (ref 39–117)
ALT SERPL-CCNC: 5 IU/L (ref 0–32)
AST SERPL-CCNC: 14 IU/L (ref 0–40)
BASOPHILS # BLD AUTO: 0.1 X10E3/UL (ref 0–0.2)
BASOPHILS NFR BLD AUTO: 1 %
BILIRUB SERPL-MCNC: 0.4 MG/DL (ref 0–1.2)
BUN SERPL-MCNC: 10 MG/DL (ref 6–24)
BUN/CREAT SERPL: 10 (ref 9–23)
CALCIUM SERPL-MCNC: 9.2 MG/DL (ref 8.7–10.2)
CHLORIDE SERPL-SCNC: 102 MMOL/L (ref 96–106)
CO2 SERPL-SCNC: 25 MMOL/L (ref 20–29)
CREAT SERPL-MCNC: 0.98 MG/DL (ref 0.57–1)
EOSINOPHIL # BLD AUTO: 0.3 X10E3/UL (ref 0–0.4)
EOSINOPHIL NFR BLD AUTO: 4 %
ERYTHROCYTE [DISTWIDTH] IN BLOOD BY AUTOMATED COUNT: 15.9 % (ref 11.7–15.4)
FERRITIN SERPL-MCNC: 105 NG/ML (ref 15–150)
FOLATE SERPL-MCNC: 6.8 NG/ML
GLOBULIN SER CALC-MCNC: 2.7 G/DL (ref 1.5–4.5)
GLUCOSE SERPL-MCNC: 100 MG/DL (ref 65–99)
HCT VFR BLD AUTO: 36.1 % (ref 34–46.6)
HGB BLD-MCNC: 11.8 G/DL (ref 11.1–15.9)
IMM GRANULOCYTES # BLD AUTO: 0 X10E3/UL (ref 0–0.1)
IMM GRANULOCYTES NFR BLD AUTO: 0 %
IRON SERPL-MCNC: 69 UG/DL (ref 27–159)
LYMPHOCYTES # BLD AUTO: 2.7 X10E3/UL (ref 0.7–3.1)
LYMPHOCYTES NFR BLD AUTO: 39 %
Lab: NORMAL
MCH RBC QN AUTO: 28.5 PG (ref 26.6–33)
MCHC RBC AUTO-ENTMCNC: 32.7 G/DL (ref 31.5–35.7)
MCV RBC AUTO: 87 FL (ref 79–97)
METHYLMALONATE SERPL-SCNC: 149 NMOL/L (ref 0–378)
MONOCYTES # BLD AUTO: 0.4 X10E3/UL (ref 0.1–0.9)
MONOCYTES NFR BLD AUTO: 6 %
NEUTROPHILS # BLD AUTO: 3.5 X10E3/UL (ref 1.4–7)
NEUTROPHILS NFR BLD AUTO: 50 %
PLATELET # BLD AUTO: 369 X10E3/UL (ref 150–450)
POTASSIUM SERPL-SCNC: 3.5 MMOL/L (ref 3.5–5.2)
PREALB SERPL-MCNC: 19 MG/DL (ref 10–36)
PROT SERPL-MCNC: 6.8 G/DL (ref 6–8.5)
RBC # BLD AUTO: 4.14 X10E6/UL (ref 3.77–5.28)
SODIUM SERPL-SCNC: 142 MMOL/L (ref 134–144)
VIT B1 BLD-SCNC: 68.2 NMOL/L (ref 66.5–200)
WBC # BLD AUTO: 7 X10E3/UL (ref 3.4–10.8)

## 2020-04-08 ENCOUNTER — E-VISIT (OUTPATIENT)
Dept: FAMILY MEDICINE CLINIC | Facility: TELEHEALTH | Age: 55
End: 2020-04-08

## 2020-04-08 ENCOUNTER — TELEMEDICINE (OUTPATIENT)
Dept: BARIATRICS/WEIGHT MGMT | Facility: CLINIC | Age: 55
End: 2020-04-08

## 2020-04-08 DIAGNOSIS — Z98.84 STATUS POST BARIATRIC SURGERY: Primary | ICD-10-CM

## 2021-02-25 DIAGNOSIS — Z12.11 SCREENING FOR COLON CANCER: Primary | ICD-10-CM

## 2021-03-05 ENCOUNTER — APPOINTMENT (OUTPATIENT)
Dept: PREADMISSION TESTING | Facility: HOSPITAL | Age: 56
End: 2021-03-05

## 2021-03-05 PROCEDURE — C9803 HOPD COVID-19 SPEC COLLECT: HCPCS

## 2021-03-05 PROCEDURE — U0004 COV-19 TEST NON-CDC HGH THRU: HCPCS

## 2021-03-06 LAB — SARS-COV-2 RNA RESP QL NAA+PROBE: NOT DETECTED

## 2021-03-08 ENCOUNTER — OUTSIDE FACILITY SERVICE (OUTPATIENT)
Dept: GASTROENTEROLOGY | Facility: CLINIC | Age: 56
End: 2021-03-08

## 2021-03-08 PROCEDURE — 45380 COLONOSCOPY AND BIOPSY: CPT | Performed by: INTERNAL MEDICINE

## 2021-03-08 PROCEDURE — 45385 COLONOSCOPY W/LESION REMOVAL: CPT | Performed by: INTERNAL MEDICINE

## 2021-03-08 PROCEDURE — 88305 TISSUE EXAM BY PATHOLOGIST: CPT | Performed by: INTERNAL MEDICINE

## 2021-03-09 ENCOUNTER — LAB REQUISITION (OUTPATIENT)
Dept: LAB | Facility: HOSPITAL | Age: 56
End: 2021-03-09

## 2021-03-09 DIAGNOSIS — Z12.11 ENCOUNTER FOR SCREENING FOR MALIGNANT NEOPLASM OF COLON: ICD-10-CM

## 2021-08-31 PROCEDURE — U0004 COV-19 TEST NON-CDC HGH THRU: HCPCS | Performed by: EMERGENCY MEDICINE

## 2021-12-28 ENCOUNTER — OFFICE VISIT (OUTPATIENT)
Dept: BARIATRICS/WEIGHT MGMT | Facility: CLINIC | Age: 56
End: 2021-12-28

## 2021-12-28 VITALS
HEART RATE: 78 BPM | HEIGHT: 58 IN | BODY MASS INDEX: 27.81 KG/M2 | WEIGHT: 132.5 LBS | DIASTOLIC BLOOD PRESSURE: 78 MMHG | RESPIRATION RATE: 18 BRPM | OXYGEN SATURATION: 98 % | SYSTOLIC BLOOD PRESSURE: 112 MMHG | TEMPERATURE: 98.2 F

## 2021-12-28 DIAGNOSIS — R10.13 EPIGASTRIC PAIN: ICD-10-CM

## 2021-12-28 DIAGNOSIS — R10.13 DYSPEPSIA: Primary | ICD-10-CM

## 2021-12-28 DIAGNOSIS — R12 HEARTBURN: ICD-10-CM

## 2021-12-28 PROCEDURE — 99214 OFFICE O/P EST MOD 30 MIN: CPT | Performed by: PHYSICIAN ASSISTANT

## 2021-12-28 RX ORDER — OMEPRAZOLE 40 MG/1
40 CAPSULE, DELAYED RELEASE ORAL DAILY
Qty: 90 CAPSULE | Refills: 0 | Status: SHIPPED | OUTPATIENT
Start: 2021-12-28

## 2021-12-28 RX ORDER — ESTRADIOL 0.07 MG/D
FILM, EXTENDED RELEASE TRANSDERMAL
COMMUNITY
Start: 2021-11-26

## 2021-12-28 RX ORDER — TRIAMTERENE AND HYDROCHLOROTHIAZIDE 37.5; 25 MG/1; MG/1
CAPSULE ORAL
COMMUNITY
Start: 2021-12-07

## 2021-12-28 RX ORDER — POTASSIUM CHLORIDE 1500 MG/1
TABLET, FILM COATED, EXTENDED RELEASE ORAL
COMMUNITY
Start: 2021-12-07

## 2021-12-28 RX ORDER — NAPROXEN 500 MG/1
TABLET ORAL
COMMUNITY
Start: 2021-09-24 | End: 2022-06-28

## 2021-12-28 NOTE — PROGRESS NOTES
[facility] Bariatric Surgery  [address]  [address]  [facility] [address]  [phone]        Patient Name: Iris Varma.  YOB: 1965      Date of Visit: 2021      Reason for Visit:  reflux    HPI:  Iris Varma is a 56 y.o. female s/p LSG/HHR 2013 [facility], s/p lap recurrent HHR w/ partial gastrectomy 2020 [facility].     Says reflux were well controlled up until 3-4 months ago.  Now having recurrent issues w/ reflux, nausea, epigastric pain, and losing her voice, especially when she sings.      Taking GasX, Pepto, Tums, Rolaids prn.  Fears that she has another hiatal hernia.  Does NOT want to consider/discuss conversion to gastric bypass.        Past Medical History:   Diagnosis Date   • Acne     on Minocycline   • Allergic rhinitis    • Anxiety    • Arthritis    • Chronic back pain    • Depression    • Diabetes mellitus (CMS/HCC)    • Dyspnea on exertion    • Eczema     prn steroid cream   • Elevated LFTs    • Fatigue    • GERD (gastroesophageal reflux disease)    • Helicobacter pylori (H. pylori) infection    • Hiatal hernia    • Hormone replacement therapy (HRT)     s/p TAHBSO   • Hyperlipidemia    • Hypertension    • Hypokalemia    • Insomnia    • Joint pain    • Obesity    • Peripheral edema     daily Maxzide   • Renal insufficiency    • Vitamin D deficiency    • Wears glasses      Past Surgical History:   Procedure Laterality Date   • BREAST BIOPSY Right    •  SECTION  ,    • COLONOSCOPY     • ENDOSCOPY N/A 1/10/2020    Procedure: ESOPHAGOGASTRODUODENOSCOPY;  Surgeon: Jose Tyler MD;  Location:  [facility];  Service: General   • GASTRECTOMY N/A 1/10/2020    Procedure: GASTRECTOMY LAPAROSCOPIC;  Surgeon: Jose Tyler MD;  Location:  [facility];  Service: General   • GASTRIC SLEEVE LAPAROSCOPIC  2013    HHR   [facility]   • HIATAL HERNIA REPAIR N/A 1/10/2020    Procedure: HIATAL HERNIA REPAIR LAPAROSCOPIC;  Surgeon: Jayson  "Jose Darden MD;  Location: [facility];  Service: General   • HYSTERECTOMY  2004    w/ oophorectomy   • LAPAROSCOPIC CHOLECYSTECTOMY  09/2018    w/ PAIGE by Dr. Tyler   • TONSILLECTOMY  1997         Current Outpatient Medications:   •  carvedilol (COREG) 12.5 MG tablet, Take 12.5 mg by mouth 2 (Two) Times a Day With Meals., Disp: , Rfl:   •  escitalopram (LEXAPRO) 20 MG tablet, Take 20 mg by mouth Daily., Disp: , Rfl:   •  estradiol (MINIVELLE, VIVELLE-DOT) 0.075 MG/24HR patch, , Disp: , Rfl:   •  minocycline (MINOCIN,DYNACIN) 100 MG capsule, Take 100 mg by mouth Daily., Disp: , Rfl:   •  naproxen (NAPROSYN) 500 MG tablet, TAKE 1/2 TABLET BY MOUTH TWICE A DAY FOR 14 DAYS, Disp: , Rfl:   •  potassium chloride ER (K-TAB) 20 MEQ tablet controlled-release ER tablet, , Disp: , Rfl:   •  triamterene-hydrochlorothiazide (DYAZIDE) 37.5-25 MG per capsule, , Disp: , Rfl:   •  omeprazole (priLOSEC) 40 MG capsule, Take 1 capsule by mouth Daily., Disp: 90 capsule, Rfl: 0    Allergies   Allergen Reactions   • Ace Inhibitors Cough, Swelling and Unknown - High Severity   • Lisinopril Swelling     Lips   • Amlodipine Itching, Other (See Comments) and Unknown - High Severity     hair loss  hair loss       Family History   Problem Relation Age of Onset   • Cancer Mother    • Diabetes Mother    • Hypertension Mother    • Stroke Mother    • Hypertension Sister    • Hypertension Brother    • Diabetes Maternal Grandmother    • Hypertension Maternal Grandfather    • Stroke Maternal Grandfather    • Cancer Paternal Grandmother      Social History     Socioeconomic History   • Marital status:    Tobacco Use   • Smoking status: Never Smoker   • Smokeless tobacco: Never Used   Substance and Sexual Activity   • Alcohol use: No   • Drug use: No   • Sexual activity: Defer       /78 (BP Location: Left arm, Patient Position: Sitting, Cuff Size: Large Adult)   Pulse 78   Temp 98.2 °F (36.8 °C) (Temporal)   Resp 18   Ht 147.3 cm (58\") "   Wt 60.1 kg (132 lb 8 oz)   SpO2 98%   BMI 27.69 kg/m²     Physical Exam   Constitutional: She appears well-developed. She is cooperative.   wearing a mask   Eyes: Conjunctivae are normal. No scleral icterus.   Cardiovascular: Normal rate.   Pulmonary/Chest: Effort normal.   Abdominal: Soft. There is no abdominal tenderness.   Musculoskeletal: Normal range of motion.   Neurological: She is alert.   Skin: Skin is warm and dry. No rash noted.   Psychiatric: Judgment normal.         Assessment:  s/p LSG/HHR 8/2013 [facility], s/p lap recurrent HHR w/ partial gastrectomy 1/2020 [facility].         ICD-10-CM ICD-9-CM   1. Dyspepsia  R10.13 536.8   2. Heartburn  R12 787.1   3. Epigastric pain  R10.13 789.06        Plan:   UGI ordered to further evaluate.  Additional input to follow.            CATHY Villalpando

## 2022-01-10 ENCOUNTER — APPOINTMENT (OUTPATIENT)
Dept: PREADMISSION TESTING | Facility: HOSPITAL | Age: 57
End: 2022-01-10

## 2022-01-18 PROCEDURE — U0004 COV-19 TEST NON-CDC HGH THRU: HCPCS | Performed by: FAMILY MEDICINE

## 2022-03-22 ENCOUNTER — LAB (OUTPATIENT)
Dept: PREADMISSION TESTING | Facility: HOSPITAL | Age: 57
End: 2022-03-22

## 2022-03-22 LAB — SARS-COV-2 RNA PNL SPEC NAA+PROBE: NOT DETECTED

## 2022-03-22 PROCEDURE — U0004 COV-19 TEST NON-CDC HGH THRU: HCPCS | Performed by: PHYSICIAN ASSISTANT

## 2022-03-22 PROCEDURE — C9803 HOPD COVID-19 SPEC COLLECT: HCPCS | Performed by: PHYSICIAN ASSISTANT

## 2022-03-25 ENCOUNTER — HOSPITAL ENCOUNTER (OUTPATIENT)
Dept: GENERAL RADIOLOGY | Facility: HOSPITAL | Age: 57
Discharge: HOME | End: 2022-03-25
Admitting: PHYSICIAN ASSISTANT

## 2022-03-25 DIAGNOSIS — R10.13 DYSPEPSIA: ICD-10-CM

## 2022-03-25 PROCEDURE — 74240 X-RAY XM UPR GI TRC 1CNTRST: CPT

## 2022-03-25 RX ADMIN — BARIUM SULFATE 183 ML: 960 POWDER, FOR SUSPENSION ORAL at 10:21

## 2022-03-31 ENCOUNTER — TELEPHONE (OUTPATIENT)
Dept: BARIATRICS/WEIGHT MGMT | Facility: CLINIC | Age: 57
End: 2022-03-31

## 2022-03-31 NOTE — TELEPHONE ENCOUNTER
Patient called and left a voice mail wanting to know the results from UGI done on 3/25/2022.  Please advise.

## 2022-04-04 RX ORDER — OMEPRAZOLE 40 MG/1
CAPSULE, DELAYED RELEASE ORAL
Qty: 90 CAPSULE | Refills: 0 | OUTPATIENT
Start: 2022-04-04

## 2022-06-28 ENCOUNTER — TELEMEDICINE (OUTPATIENT)
Dept: BARIATRICS/WEIGHT MGMT | Facility: CLINIC | Age: 57
End: 2022-06-28

## 2022-06-28 VITALS — BODY MASS INDEX: 27.71 KG/M2 | WEIGHT: 132 LBS | HEIGHT: 58 IN

## 2022-06-28 DIAGNOSIS — R12 HEARTBURN: Primary | ICD-10-CM

## 2022-06-28 PROCEDURE — 99214 OFFICE O/P EST MOD 30 MIN: CPT | Performed by: PHYSICIAN ASSISTANT

## 2022-06-28 NOTE — PROGRESS NOTES
[facility] Bariatric Surgery  [address]  [address]  [facility] [address]  [phone]        Patient Name: Iris Varma.  YOB: 1965      Date of Visit: 2022      Reason for Visit:  Reflux      HPI:  Iris Varma is a 56 y.o. female s/p LSG/HHR 2013 GDW, s/p lap recurrent HHR w/ partial gastrectomy 2020 GDW.     Having recurrent issues w/ reflux, nausea, epigastric pain, and losing her voice (especially when she sings) for the last 9+ months.  Taking GasX, Pepto, Tums, Rolaids prn w/out relief.  Has Omeprazole RX but has not been taking consistently.    Fears she has another hiatal hernia.  Does NOT want to consider conversion to gastric bypass.      UGI 3/25/22 @[facility] - unremarkable, no reflux, no hiatal hernia.       Denies COVID, although she had a possible COVID exposure this past weekend.        Past Medical History:   Diagnosis Date   • Acne     on Minocycline   • Allergic rhinitis    • Anxiety    • Arthritis    • Chronic back pain    • Depression    • Diabetes mellitus (HCC)    • Dyspnea on exertion    • Eczema     prn steroid cream   • Elevated LFTs    • Fatigue    • GERD (gastroesophageal reflux disease)    • Helicobacter pylori (H. pylori) infection     remotely, sx improved w/ RX   • Hiatal hernia    • Hormone replacement therapy (HRT)     s/p TAHBSO   • Hyperlipidemia    • Hypertension    • Hypokalemia    • Insomnia    • Joint pain    • Obesity    • Peripheral edema     daily Maxzide   • Renal insufficiency    • Vitamin D deficiency    • Wears glasses      Past Surgical History:   Procedure Laterality Date   • BREAST BIOPSY Right    •  SECTION  ,    • COLONOSCOPY     • ENDOSCOPY N/A 1/10/2020    Procedure: ESOPHAGOGASTRODUODENOSCOPY;  Surgeon: Jose Tyler MD;  Location: [facility];  Service: General   • GASTRECTOMY N/A 1/10/2020    Procedure: GASTRECTOMY LAPAROSCOPIC;  Surgeon: Jose Tyler MD;  Location:   [facility];  Service: General   • GASTRIC SLEEVE LAPAROSCOPIC  08/19/2013    HHR   [facility]   • HIATAL HERNIA REPAIR N/A 1/10/2020    Procedure: HIATAL HERNIA REPAIR LAPAROSCOPIC;  Surgeon: Jose Tyler MD;  Location:  [facility];  Service: General   • HYSTERECTOMY  2004    w/ oophorectomy   • LAPAROSCOPIC CHOLECYSTECTOMY  09/2018    w/ PAIGE by Dr. Tyler   • TONSILLECTOMY  1997         Current Outpatient Medications:   •  carvedilol (COREG) 12.5 MG tablet, Take 12.5 mg by mouth 2 (Two) Times a Day With Meals., Disp: , Rfl:   •  escitalopram (LEXAPRO) 20 MG tablet, Take 20 mg by mouth Daily., Disp: , Rfl:   •  estradiol (MINIVELLE, VIVELLE-DOT) 0.075 MG/24HR patch, , Disp: , Rfl:   •  potassium chloride ER (K-TAB) 20 MEQ tablet controlled-release ER tablet, , Disp: , Rfl:   •  triamterene-hydrochlorothiazide (DYAZIDE) 37.5-25 MG per capsule, , Disp: , Rfl:   •  minocycline (MINOCIN,DYNACIN) 100 MG capsule, Take 100 mg by mouth Daily., Disp: , Rfl:   •  omeprazole (priLOSEC) 40 MG capsule, Take 1 capsule by mouth Daily., Disp: 90 capsule, Rfl: 0    Allergies   Allergen Reactions   • Ace Inhibitors Cough, Swelling and Unknown - High Severity   • Lisinopril Swelling     Lips   • Amlodipine Itching, Other (See Comments) and Unknown - High Severity     hair loss  hair loss       Family History   Problem Relation Age of Onset   • Cancer Mother    • Diabetes Mother    • Hypertension Mother    • Stroke Mother    • Hypertension Sister    • Hypertension Brother    • Diabetes Maternal Grandmother    • Hypertension Maternal Grandfather    • Stroke Maternal Grandfather    • Cancer Paternal Grandmother      Social History     Socioeconomic History   • Marital status:    Tobacco Use   • Smoking status: Never Smoker   • Smokeless tobacco: Never Used   Substance and Sexual Activity   • Alcohol use: No   • Drug use: No   • Sexual activity: Defer       Ht 147.3 cm (58\")   Wt 59.9 kg (132 lb)   BMI 27.59 kg/m²     Physical Exam "   Constitutional: She is oriented to person, place, and time. She appears well-developed. No distress.   Eyes: No scleral icterus.   Pulmonary/Chest: Effort normal.   Neurological: She is alert and oriented to person, place, and time.         Assessment:  s/p LSG/HHR 8/2013 GDW, s/p lap recurrent HHR w/ partial gastrectomy 1/2020 GDW.         ICD-10-CM ICD-9-CM   1. Heartburn  R12 787.1        Plan:   EGD w/ Dr. Tyler for further evaluation.  Risks/benefits discussed.  Start taking PPI RX daily.  Additional input to follow.         CATHY Villalpando       Note: This was an audio and video enabled telemedicine encounter conducted via Zoom during the COVID-19 pandemic.  Patient is located in [place].  Provider is at her office.  Consent was obtained prior to the visit.

## 2022-07-22 ENCOUNTER — APPOINTMENT (OUTPATIENT)
Dept: PREADMISSION TESTING | Facility: HOSPITAL | Age: 57
End: 2022-07-22

## 2022-07-22 PROCEDURE — U0004 COV-19 TEST NON-CDC HGH THRU: HCPCS | Performed by: PHYSICIAN ASSISTANT

## 2022-07-23 LAB — SARS-COV-2 RNA PNL SPEC NAA+PROBE: NOT DETECTED

## 2022-07-25 ENCOUNTER — OUTSIDE FACILITY SERVICE (OUTPATIENT)
Dept: BARIATRICS/WEIGHT MGMT | Facility: CLINIC | Age: 57
End: 2022-07-25

## 2022-07-25 ENCOUNTER — TELEPHONE (OUTPATIENT)
Dept: BARIATRICS/WEIGHT MGMT | Facility: CLINIC | Age: 57
End: 2022-07-25

## 2022-07-25 DIAGNOSIS — R10.13 DYSPEPSIA: Primary | ICD-10-CM

## 2022-07-25 DIAGNOSIS — R10.13 EPIGASTRIC PAIN: ICD-10-CM

## 2022-07-25 DIAGNOSIS — R12 HEARTBURN: ICD-10-CM

## 2022-07-25 PROCEDURE — 88305 TISSUE EXAM BY PATHOLOGIST: CPT | Performed by: SURGERY

## 2022-07-25 PROCEDURE — 43239 EGD BIOPSY SINGLE/MULTIPLE: CPT | Performed by: SURGERY

## 2022-07-25 PROCEDURE — 88342 IMHCHEM/IMCYTCHM 1ST ANTB: CPT | Performed by: SURGERY

## 2022-07-25 NOTE — TELEPHONE ENCOUNTER
----- Message from Jose Tyler MD sent at 7/25/2022  3:29 PM EDT -----    Can you please order a gastric emptying study and we will go from there, thanks!

## 2022-07-26 ENCOUNTER — LAB REQUISITION (OUTPATIENT)
Dept: LAB | Facility: HOSPITAL | Age: 57
End: 2022-07-26

## 2022-07-26 DIAGNOSIS — R12 HEARTBURN: ICD-10-CM

## 2022-07-29 ENCOUNTER — TELEPHONE (OUTPATIENT)
Dept: BARIATRICS/WEIGHT MGMT | Facility: CLINIC | Age: 57
End: 2022-07-29

## 2022-07-29 LAB
CYTO UR: NORMAL
LAB AP CASE REPORT: NORMAL
LAB AP CLINICAL INFORMATION: NORMAL
LAB AP SPECIAL STAINS: NORMAL
PATH REPORT.FINAL DX SPEC: NORMAL
PATH REPORT.GROSS SPEC: NORMAL

## 2023-10-12 ENCOUNTER — OFFICE VISIT (OUTPATIENT)
Dept: BARIATRICS/WEIGHT MGMT | Facility: CLINIC | Age: 58
End: 2023-10-12
Payer: COMMERCIAL

## 2023-10-12 VITALS
DIASTOLIC BLOOD PRESSURE: 68 MMHG | HEART RATE: 88 BPM | BODY MASS INDEX: 29.18 KG/M2 | SYSTOLIC BLOOD PRESSURE: 126 MMHG | WEIGHT: 139 LBS | OXYGEN SATURATION: 98 % | TEMPERATURE: 97.6 F | HEIGHT: 58 IN

## 2023-10-12 DIAGNOSIS — Z13.0 SCREENING FOR IRON DEFICIENCY ANEMIA: ICD-10-CM

## 2023-10-12 DIAGNOSIS — R10.13 EPIGASTRIC PAIN: ICD-10-CM

## 2023-10-12 DIAGNOSIS — R10.13 DYSPEPSIA: Primary | ICD-10-CM

## 2023-10-12 DIAGNOSIS — R53.83 FATIGUE, UNSPECIFIED TYPE: ICD-10-CM

## 2023-10-12 DIAGNOSIS — K91.2 POSTGASTRECTOMY MALABSORPTION: ICD-10-CM

## 2023-10-12 DIAGNOSIS — E55.9 HYPOVITAMINOSIS D: ICD-10-CM

## 2023-10-12 DIAGNOSIS — Z90.3 POSTGASTRECTOMY MALABSORPTION: ICD-10-CM

## 2023-10-12 DIAGNOSIS — R12 HEARTBURN: ICD-10-CM

## 2023-10-12 PROCEDURE — 99214 OFFICE O/P EST MOD 30 MIN: CPT | Performed by: PHYSICIAN ASSISTANT

## 2023-10-12 PROCEDURE — 1159F MED LIST DOCD IN RCRD: CPT | Performed by: PHYSICIAN ASSISTANT

## 2023-10-12 PROCEDURE — 1160F RVW MEDS BY RX/DR IN RCRD: CPT | Performed by: PHYSICIAN ASSISTANT

## 2023-10-12 NOTE — PROGRESS NOTES
[facility] Bariatric Surgery  [address]  [address]  [facility] [id number] [phone]        Patient Name:  Iris Varma  :  1965      Date of Visit: 10/12/2023      Reason for Visit:   abdominal pain, vomiting, diarrhea      HPI: Iris Varma is a 58 y.o. female s/p LSG/HHR 2013 GDW, s/p lap recurrent HHR w/ partial gastrectomy 2020 GDW.     LOV 22: having recurrent issues w/ reflux, nausea, epigastric pain.     Recent imaging:     UGI 3/25/22- unremarkable, no reflux, no HH     EGD 22 GDW: No evidence of recurrent hiatal hernia.  Some grade 1 esophagitis without erosions.  Z-line 36 cm.  Some focal areas of antritis.  Some pyloric spasm.  No bile in the stomach.  No evidence of partial gastric outlet obstruction.    Final Diagnosis   1.  STOMACH, ANTRUM, BIOPSY:  Antral-type gastric mucosa with erosion, focal acute inflammation and reactive epithelial changes  Negative for H. pylori organisms on immunohistochemical stain  Negative for intestinal metaplasia and dysplasia    2.  ESOPHAGUS, DISTAL, BIOPSY:  Squamous mucosa with features suggestive of reflux esophagitis  Minute fragment of gastric foveolar mucosa, negative for intestinal metaplasia     Dr. Tyler advised proceeding with gastric emptying study, but this was never scheduled and patient has never followed back up until today.    She presents today reporting similar symptoms to what she was experiencing last year.  She is not currently taking her omeprazole and has not had any for several months.  She does feel that her reflux was better controlled when she was on omeprazole.  She tells me she quite often feels bloated and full.  This is her main complaint today.  She has also been taking NSAIDs daily for an orthopedic injury.  She has had ongoing issues with intermittent diarrhea since her cholecystectomy.  Says she will have a bowel movement every other day.    Denies dysphagia, hair loss, memory  loss, vision changes, and numbness/tingling.  Getting ??g prot/day.  Drinking 64 fluid oz/day. Not taking any vitamins. Currently not on any GI meds.           Past Medical History:   Diagnosis Date    Acne     on Minocycline    Allergic rhinitis     Anxiety     Arthritis     Chronic back pain     Depression     Diabetes mellitus     Dyspnea on exertion     Eczema     prn steroid cream    Elevated LFTs     Fatigue     GERD (gastroesophageal reflux disease)     Helicobacter pylori (H. pylori) infection     remotely, sx improved w/ RX    Hiatal hernia     Hormone replacement therapy (HRT)     s/p TAHBSO    Hyperlipidemia     Hypertension     Hypokalemia     Insomnia     Joint pain     Obesity     Peripheral edema     daily Maxzide    Renal insufficiency     Vitamin D deficiency     Wears glasses      Past Surgical History:   Procedure Laterality Date    BREAST BIOPSY Right 2012     SECTION  ,     COLONOSCOPY      ENDOSCOPY N/A 1/10/2020    Procedure: ESOPHAGOGASTRODUODENOSCOPY;  Surgeon: Jose Tyler MD;  Location:  [facility];  Service: General    GASTRECTOMY N/A 1/10/2020    Procedure: GASTRECTOMY LAPAROSCOPIC;  Surgeon: Jose Tyler MD;  Location:  [facility];  Service: General    GASTRIC SLEEVE LAPAROSCOPIC  2013    HHR   GDW    HIATAL HERNIA REPAIR N/A 1/10/2020    Procedure: HIATAL HERNIA REPAIR LAPAROSCOPIC;  Surgeon: Jose Tyler MD;  Location:  [facility];  Service: General    HYSTERECTOMY      w/ oophorectomy    LAPAROSCOPIC CHOLECYSTECTOMY  2018    w/ PAIGE by Dr. Tyler    TONSILLECTOMY       Outpatient Medications Marked as Taking for the 10/12/23 encounter (Office Visit) with Alysha Greenfield PA   Medication Sig Dispense Refill    carvedilol (COREG) 12.5 MG tablet Take 1 tablet by mouth 2 (Two) Times a Day With Meals.      escitalopram (LEXAPRO) 20 MG tablet Take 1 tablet by mouth Daily.      estradiol (MINIVELLE, VIVELLE-DOT) 0.075 MG/24HR patch        "ibuprofen (ADVIL,MOTRIN) 600 MG tablet       lidocaine (LIDODERM) 5 %       methocarbamol (ROBAXIN) 500 MG tablet Take 1 tablet by mouth.      minocycline (MINOCIN,DYNACIN) 100 MG capsule Take 1 capsule by mouth Daily.      omeprazole (priLOSEC) 40 MG capsule Take 1 capsule by mouth Daily. 90 capsule 0    potassium chloride ER (K-TAB) 20 MEQ tablet controlled-release ER tablet       triamterene-hydrochlorothiazide (DYAZIDE) 37.5-25 MG per capsule          Allergies   Allergen Reactions    Ace Inhibitors Cough, Swelling, Unknown - High Severity and Unknown (See Comments)    Lisinopril Swelling     Lips    Amlodipine Itching, Other (See Comments), Unknown - High Severity and Unknown (See Comments)     hair loss    hair loss   hair loss       Social History     Socioeconomic History    Marital status:    Tobacco Use    Smoking status: Never    Smokeless tobacco: Never   Vaping Use    Vaping Use: Never used   Substance and Sexual Activity    Alcohol use: No    Drug use: No    Sexual activity: Defer     Social History     Social History Narrative    Not on file       /68 (BP Location: Left arm, Patient Position: Sitting)   Pulse 88   Temp 97.6 øF (36.4 øC)   Ht 147.3 cm (58\")   Wt 63 kg (139 lb)   SpO2 98%   BMI 29.05 kg/mý     Physical Exam  Constitutional:       Appearance: She is obese.   HENT:      Head: Normocephalic and atraumatic.   Cardiovascular:      Rate and Rhythm: Normal rate and regular rhythm.   Pulmonary:      Effort: Pulmonary effort is normal.      Breath sounds: Normal breath sounds.   Abdominal:      General: Bowel sounds are normal. There is no distension.      Palpations: Abdomen is soft. There is no mass.      Tenderness: There is no abdominal tenderness.   Skin:     General: Skin is warm and dry.   Neurological:      General: No focal deficit present.      Mental Status: She is alert and oriented to person, place, and time.   Psychiatric:         Mood and Affect: Mood normal.   "       Behavior: Behavior normal.           Assessment:  10 years s/p LSG/HHR 8/2013 GDW, s/p lap recurrent HHR w/ partial gastrectomy 1/2020 [facility].     ICD-10-CM ICD-9-CM   1. Dyspepsia  R10.13 536.8   2. Heartburn  R12 787.1   3. Epigastric pain  R10.13 789.06   4. Fatigue, unspecified type  R53.83 780.79   5. Screening for iron deficiency anemia  Z13.0 V78.0   6. Postgastrectomy malabsorption  K91.2 579.3    Z90.3    7. Hypovitaminosis D  E55.9 268.9       BMI is >= 25 and <30. (Overweight) The following options were offered after discussion;: exercise counseling/recommendations and nutrition counseling/recommendations        Plan: Will order GES for further eval. UGI and EGD with no recurrent HH last year. Restart Omeprazole 40mg daily.  Reduce NSAID use.  Will check h pylori UBT. Routine bariatric labs ordered.      The patient was instructed to follow up in one year, sooner if needed.      I spent over 30 minutes caring for this patient on this date of service. This time includes time spent by me in the following activities: preparing for the visit, reviewing tests, performing a medically appropriate examination and/or evaluation, ordering medications, tests, or procedures and documenting information in the medical record.

## 2023-10-13 RX ORDER — OMEPRAZOLE 40 MG/1
40 CAPSULE, DELAYED RELEASE ORAL DAILY
Qty: 90 CAPSULE | Refills: 0 | Status: SHIPPED | OUTPATIENT
Start: 2023-10-13

## 2023-10-13 NOTE — TELEPHONE ENCOUNTER
Rx Refill Note  Requested Prescriptions     Pending Prescriptions Disp Refills    omeprazole (priLOSEC) 40 MG capsule [Pharmacy Med Name: OMEPRAZOLE DR 40 MG CAPSULE] 90 capsule 0     Sig: TAKE ONE CAPSULE BY MOUTH DAILY      Last office visit with prescribing clinician: 10/12/2023   Last telemedicine visit with prescribing clinician: Visit date not found   Next office visit with prescribing clinician: Visit date not found                         Would you like a call back once the refill request has been completed: [] Yes [x] No    If the office needs to give you a call back, can they leave a voicemail: [] Yes [x] No    Channing Keyes MA  10/13/23, 08:57 EDT

## 2023-10-16 LAB
25(OH)D3+25(OH)D2 SERPL-MCNC: 14.3 NG/ML (ref 30–100)
ALBUMIN SERPL-MCNC: 3.5 G/DL (ref 3.8–4.9)
ALBUMIN/GLOB SERPL: 1.4 {RATIO} (ref 1.2–2.2)
ALP SERPL-CCNC: 77 IU/L (ref 44–121)
ALT SERPL-CCNC: 13 IU/L (ref 0–32)
AST SERPL-CCNC: 13 IU/L (ref 0–40)
BASOPHILS # BLD AUTO: 0 X10E3/UL (ref 0–0.2)
BASOPHILS NFR BLD AUTO: 0 %
BILIRUB SERPL-MCNC: 0.4 MG/DL (ref 0–1.2)
BUN SERPL-MCNC: 17 MG/DL (ref 6–24)
BUN/CREAT SERPL: 17 (ref 9–23)
CALCIUM SERPL-MCNC: 8.7 MG/DL (ref 8.7–10.2)
CHLORIDE SERPL-SCNC: 106 MMOL/L (ref 96–106)
CO2 SERPL-SCNC: 25 MMOL/L (ref 20–29)
CREAT SERPL-MCNC: 1.02 MG/DL (ref 0.57–1)
EGFRCR SERPLBLD CKD-EPI 2021: 63 ML/MIN/1.73
EOSINOPHIL # BLD AUTO: 0.2 X10E3/UL (ref 0–0.4)
EOSINOPHIL NFR BLD AUTO: 2 %
ERYTHROCYTE [DISTWIDTH] IN BLOOD BY AUTOMATED COUNT: 17 % (ref 11.7–15.4)
FERRITIN SERPL-MCNC: 19 NG/ML (ref 15–150)
FOLATE SERPL-MCNC: 15.4 NG/ML
GLOBULIN SER CALC-MCNC: 2.5 G/DL (ref 1.5–4.5)
GLUCOSE SERPL-MCNC: 107 MG/DL (ref 70–99)
HCT VFR BLD AUTO: 34.7 % (ref 34–46.6)
HGB BLD-MCNC: 11.2 G/DL (ref 11.1–15.9)
IMM GRANULOCYTES # BLD AUTO: 0 X10E3/UL (ref 0–0.1)
IMM GRANULOCYTES NFR BLD AUTO: 0 %
IRON SERPL-MCNC: 60 UG/DL (ref 27–159)
LYMPHOCYTES # BLD AUTO: 2.1 X10E3/UL (ref 0.7–3.1)
LYMPHOCYTES NFR BLD AUTO: 23 %
MCH RBC QN AUTO: 28.7 PG (ref 26.6–33)
MCHC RBC AUTO-ENTMCNC: 32.3 G/DL (ref 31.5–35.7)
MCV RBC AUTO: 89 FL (ref 79–97)
METHYLMALONATE SERPL-SCNC: 280 NMOL/L (ref 0–378)
MONOCYTES # BLD AUTO: 0.6 X10E3/UL (ref 0.1–0.9)
MONOCYTES NFR BLD AUTO: 6 %
NEUTROPHILS # BLD AUTO: 6.2 X10E3/UL (ref 1.4–7)
NEUTROPHILS NFR BLD AUTO: 69 %
PLATELET # BLD AUTO: 352 X10E3/UL (ref 150–450)
POTASSIUM SERPL-SCNC: 3.8 MMOL/L (ref 3.5–5.2)
PREALB SERPL-MCNC: 22 MG/DL (ref 10–36)
PROT SERPL-MCNC: 6 G/DL (ref 6–8.5)
RBC # BLD AUTO: 3.9 X10E6/UL (ref 3.77–5.28)
SODIUM SERPL-SCNC: 144 MMOL/L (ref 134–144)
UREA BREATH TEST QL: NEGATIVE
VIT B1 BLD-SCNC: 69.8 NMOL/L (ref 66.5–200)
WBC # BLD AUTO: 9.1 X10E3/UL (ref 3.4–10.8)

## 2023-10-20 RX ORDER — ERGOCALCIFEROL 1.25 MG/1
50000 CAPSULE ORAL
Qty: 12 CAPSULE | Refills: 0 | Status: SHIPPED | OUTPATIENT
Start: 2023-10-20 | End: 2024-01-06

## 2024-01-18 RX ORDER — OMEPRAZOLE 40 MG/1
40 CAPSULE, DELAYED RELEASE ORAL DAILY
Qty: 90 CAPSULE | Refills: 0 | Status: SHIPPED | OUTPATIENT
Start: 2024-01-18

## 2024-01-18 NOTE — TELEPHONE ENCOUNTER
Rx Refill Note  Requested Prescriptions     Pending Prescriptions Disp Refills    omeprazole (priLOSEC) 40 MG capsule 90 capsule 0     Sig: Take 1 capsule by mouth Daily.      Last office visit with prescribing clinician: 10/12/2023   Last telemedicine visit with prescribing clinician: Visit date not found   Next office visit with prescribing clinician: Visit date not found                         Would you like a call back once the refill request has been completed: [] Yes [x] No    If the office needs to give you a call back, can they leave a voicemail: [] Yes [x] No    Christine Fletcher MA  01/18/24, 11:47 EST

## 2024-02-02 ENCOUNTER — HOSPITAL ENCOUNTER (OUTPATIENT)
Dept: NUCLEAR MEDICINE | Facility: HOSPITAL | Age: 59
Discharge: HOME | End: 2024-02-02
Payer: COMMERCIAL

## 2024-02-02 DIAGNOSIS — R10.13 DYSPEPSIA: ICD-10-CM

## 2024-02-02 DIAGNOSIS — R10.13 EPIGASTRIC PAIN: ICD-10-CM

## 2024-02-02 PROCEDURE — A9541 TC99M SULFUR COLLOID: HCPCS | Performed by: PHYSICIAN ASSISTANT

## 2024-02-02 PROCEDURE — 0 TECHNETIUM SULFUR COLLOID: Performed by: PHYSICIAN ASSISTANT

## 2024-02-02 PROCEDURE — 78264 GASTRIC EMPTYING IMG STUDY: CPT

## 2024-02-02 RX ADMIN — TECHNETIUM TC 99M SULFUR COLLOID 1 DOSE: KIT at 11:29

## 2024-02-08 ENCOUNTER — TELEPHONE (OUTPATIENT)
Dept: BARIATRICS/WEIGHT MGMT | Facility: CLINIC | Age: 59
End: 2024-02-08
Payer: COMMERCIAL

## 2024-02-08 RX ORDER — METOCLOPRAMIDE 5 MG/1
5 TABLET ORAL
Qty: 120 TABLET | Refills: 1 | Status: SHIPPED | OUTPATIENT
Start: 2024-02-08

## 2024-02-08 NOTE — TELEPHONE ENCOUNTER
Confirmed no GLP-1 meds. Reglan rx sent.     ----- Message from Jose Tyler MD sent at 2/7/2024  3:01 PM EST -----  I assume she is not on any GLP-1 type meds?  Agree with PPI.  Can start a trial of Reglan.  Please have her see me to discuss options, thanks!

## 2024-02-13 ENCOUNTER — OFFICE VISIT (OUTPATIENT)
Dept: BARIATRICS/WEIGHT MGMT | Facility: CLINIC | Age: 59
End: 2024-02-13
Payer: COMMERCIAL

## 2024-02-13 VITALS
HEIGHT: 58 IN | OXYGEN SATURATION: 97 % | WEIGHT: 140.5 LBS | HEART RATE: 84 BPM | SYSTOLIC BLOOD PRESSURE: 120 MMHG | DIASTOLIC BLOOD PRESSURE: 76 MMHG | TEMPERATURE: 97.1 F | BODY MASS INDEX: 29.49 KG/M2

## 2024-02-13 DIAGNOSIS — Z98.84 S/P LAPAROSCOPIC SLEEVE GASTRECTOMY: ICD-10-CM

## 2024-02-13 DIAGNOSIS — R60.9 PERIPHERAL EDEMA: ICD-10-CM

## 2024-02-13 DIAGNOSIS — K31.84 GASTROPARESIS: Primary | ICD-10-CM

## 2024-02-13 DIAGNOSIS — F41.9 ANXIETY: ICD-10-CM

## 2024-02-13 DIAGNOSIS — K21.9 GERD WITHOUT ESOPHAGITIS: ICD-10-CM

## 2024-02-13 PROCEDURE — 99214 OFFICE O/P EST MOD 30 MIN: CPT | Performed by: SURGERY

## 2024-02-13 PROCEDURE — 1159F MED LIST DOCD IN RCRD: CPT | Performed by: SURGERY

## 2024-02-13 PROCEDURE — 1160F RVW MEDS BY RX/DR IN RCRD: CPT | Performed by: SURGERY
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
## 2025-06-02 ENCOUNTER — OFFICE VISIT (OUTPATIENT)
Dept: BARIATRICS/WEIGHT MGMT | Facility: CLINIC | Age: 60
End: 2025-06-02
Payer: COMMERCIAL

## 2025-06-02 VITALS
WEIGHT: 138.5 LBS | BODY MASS INDEX: 29.07 KG/M2 | OXYGEN SATURATION: 98 % | HEART RATE: 80 BPM | SYSTOLIC BLOOD PRESSURE: 116 MMHG | TEMPERATURE: 97.1 F | RESPIRATION RATE: 16 BRPM | HEIGHT: 58 IN | DIASTOLIC BLOOD PRESSURE: 72 MMHG

## 2025-06-02 DIAGNOSIS — Z90.3 POSTGASTRECTOMY MALABSORPTION: ICD-10-CM

## 2025-06-02 DIAGNOSIS — E55.9 VITAMIN D DEFICIENCY: ICD-10-CM

## 2025-06-02 DIAGNOSIS — R79.0 ABNORMAL BLOOD LEVEL OF IRON: ICD-10-CM

## 2025-06-02 DIAGNOSIS — R53.83 FATIGUE, UNSPECIFIED TYPE: ICD-10-CM

## 2025-06-02 DIAGNOSIS — E66.3 OVERWEIGHT WITH BODY MASS INDEX (BMI) OF 28 TO 28.9 IN ADULT: Primary | ICD-10-CM

## 2025-06-02 DIAGNOSIS — K21.9 GERD WITHOUT ESOPHAGITIS: ICD-10-CM

## 2025-06-02 DIAGNOSIS — K91.2 POSTGASTRECTOMY MALABSORPTION: ICD-10-CM

## 2025-06-02 PROCEDURE — 99214 OFFICE O/P EST MOD 30 MIN: CPT | Performed by: NURSE PRACTITIONER

## 2025-06-02 RX ORDER — OMEPRAZOLE 40 MG/1
40 CAPSULE, DELAYED RELEASE ORAL DAILY
Qty: 90 CAPSULE | Refills: 1 | Status: SHIPPED | OUTPATIENT
Start: 2025-06-02

## 2025-06-02 NOTE — PROGRESS NOTES
[facility] Bariatric Surgery  [address]  [address]  [facility] [id number] [phone]        Patient Name:  Iris Varma  :  1965      Date of Visit: 2025      Reason for Visit:  having issues; reflux and vomiting      HPI: Iris Varma is a 59 y.o. female s/p LSG/HHR 2013 GDW, s/p lap recurrent HHR w/ partial gastrectomy 2020 [facility].     LOV 24 w/ Dr. Tyler: was having issues with GERD, gastroparesis w/ abd pain and bloating. Planned to proceed with revision to a Diogenes-en-Y gastric bypass. She ultimately decided against revision.     Having issues w/ vomiting, reflux for the past two months. Vomiting undigested foods, occurs 1x/wk. Reflux constantly. Denies nighttime vomiting/reflux.  Occasional nausea. S/p nahomi. Not currently on PPI. Takes pepto prn. Denies dysphagia, abdominal pain. Also reports diarrhea. Denies GLP-1 use.    Is not interested in revision to RNY gastric bypass, would be interested in revision of sleeve, or surgical HHR.    Getting ??g prot/day.  Not currently tracking. Eating 1 meals per day, plus snacks. Drinking 64 fluid oz/day.      Not taking vitamins.    Physical activity: no routine exercise, currently limited due to recent neck/spine surgery.     Presurgery weight: 181 pounds. Today's weight is 62.8 kg (138 lb 8 oz), today's Body mass index is 28.95 kg/m². And her weight loss since surgery is 43 lbs.    Past Medical History:   Diagnosis Date    Acne     on Minocycline    Allergic rhinitis     Anxiety     Arthritis     Chronic back pain     Depression     Diabetes mellitus     Dyspnea on exertion     Eczema     prn steroid cream    Elevated LFTs     Fatigue     GERD (gastroesophageal reflux disease)     Helicobacter pylori (H. pylori) infection     remotely, sx improved w/ RX    Hiatal hernia     Hormone replacement therapy (HRT)     s/p TAHBSO    Hyperlipidemia     Hypertension     Hypokalemia     Insomnia     Joint pain     Obesity      Peripheral edema     daily Maxzide    Renal insufficiency     Vitamin D deficiency     Wears glasses      Past Surgical History:   Procedure Laterality Date    BREAST BIOPSY Right 2012     SECTION  , 2003    preeclampsia with both, early deliveries    COLONOSCOPY  2017    ENDOSCOPY N/A 01/10/2020    Procedure: ESOPHAGOGASTRODUODENOSCOPY;  Surgeon: Jose Tyler MD;  Location:  [facility];  Service: General    GASTRECTOMY N/A 01/10/2020    Procedure: GASTRECTOMY LAPAROSCOPIC;  Surgeon: Jose Tyler MD;  Location:  [facility];  Service: General    GASTRIC SLEEVE LAPAROSCOPIC  2013    HHR   [facility]DW    HIATAL HERNIA REPAIR N/A 01/10/2020    Procedure: HIATAL HERNIA REPAIR LAPAROSCOPIC;  Surgeon: Jose Tyler MD;  Location:  [facility];  Service: General    HYSTERECTOMY      w/ oophorectomy. open    LAPAROSCOPIC CHOLECYSTECTOMY  2018    w/ PAIGE by Dr. Tyler    TONSILLECTOMY       Outpatient Medications Marked as Taking for the 25 encounter (Office Visit) with Pina Marc APRN   Medication Sig Dispense Refill    carvedilol (COREG) 12.5 MG tablet Take 1 tablet by mouth 2 (Two) Times a Day With Meals.      escitalopram (LEXAPRO) 20 MG tablet Take 1 tablet by mouth Daily.      estradiol (MINIVELLE, VIVELLE-DOT) 0.075 MG/24HR patch       ibuprofen (ADVIL,MOTRIN) 600 MG tablet       lidocaine (LIDODERM) 5 %       methocarbamol (ROBAXIN) 500 MG tablet Take 1 tablet by mouth As Needed.      omeprazole (priLOSEC) 40 MG capsule Take 1 capsule by mouth Daily. 90 capsule 1    potassium chloride ER (K-TAB) 20 MEQ tablet controlled-release ER tablet       triamterene-hydrochlorothiazide (DYAZIDE) 37.5-25 MG per capsule          Allergies   Allergen Reactions    Ace Inhibitors Cough, Swelling, Unknown - High Severity and Unknown (See Comments)    Lisinopril Swelling     Lips    Amlodipine Itching, Other (See Comments), Unknown - High Severity and Unknown (See Comments)     hair  "loss    hair loss   hair loss       Social History     Socioeconomic History    Marital status:    Tobacco Use    Smoking status: Never    Smokeless tobacco: Never   Vaping Use    Vaping status: Never Used   Substance and Sexual Activity    Alcohol use: No    Drug use: No    Sexual activity: Defer     Social History     Social History Narrative    Not on file       /72 (BP Location: Left arm, Patient Position: Sitting)   Pulse 80   Temp 97.1 °F (36.2 °C)   Resp 16   Ht 147.3 cm (58\")   Wt 62.8 kg (138 lb 8 oz)   SpO2 98%   BMI 28.95 kg/m²     Physical Exam  HENT:      Head: Normocephalic and atraumatic.   Cardiovascular:      Rate and Rhythm: Normal rate and regular rhythm.   Pulmonary:      Effort: Pulmonary effort is normal.      Breath sounds: Normal breath sounds.   Abdominal:      General: Bowel sounds are normal. There is no distension.      Palpations: Abdomen is soft. There is no mass.      Tenderness: There is no abdominal tenderness.   Skin:     General: Skin is warm and dry.   Neurological:      General: No focal deficit present.      Mental Status: She is alert and oriented to person, place, and time.   Psychiatric:         Mood and Affect: Mood normal.         Behavior: Behavior normal.           Assessment:  10 years s/p LSG/HHR 8/2013 [facility]DW, s/p lap recurrent HHR w/ partial gastrectomy 1/2020 [facility].     ICD-10-CM ICD-9-CM   1. Overweight with body mass index (BMI) of 28 to 28.9 in adult  E66.3 278.02    Z68.28 V85.24   2. Postgastrectomy malabsorption  K91.2 579.3    Z90.3    3. Vitamin D deficiency  E55.9 268.9   4. Abnormal blood level of iron  R79.0 790.6   5. Fatigue, unspecified type  R53.83 780.79   6. GERD without esophagitis  K21.9 530.81       BMI is >= 25 and <30. (Overweight) The following options were offered after discussion;: see plan     Plan: UGI ordered. Will discuss further w/ Dr. Tyler. Additional input to follow. Restart Omeprazole 40mg daily.  Routine bariatric " labs ordered.  Adjustments pending lab results. Call with issues/concerns.    The patient was instructed to follow up pending eval, sooner if needed.    I spent 30 minutes caring for Iris on this date of service. This time includes time spent by me in the following activities: preparing for the visit, reviewing tests, obtaining and/or reviewing a separately obtained history, performing a medically appropriate examination and/or evaluation, counseling and educating the patient/family/caregiver, ordering medications, tests, or procedures, and documenting information in the medical record.       Pina Marc, APRN

## 2025-06-07 LAB
25(OH)D3+25(OH)D2 SERPL-MCNC: 18.9 NG/ML (ref 30–100)
ALBUMIN SERPL-MCNC: 3.9 G/DL (ref 3.8–4.9)
ALP SERPL-CCNC: 118 IU/L (ref 44–121)
ALT SERPL-CCNC: 12 IU/L (ref 0–32)
AST SERPL-CCNC: 18 IU/L (ref 0–40)
BASOPHILS # BLD AUTO: 0 X10E3/UL (ref 0–0.2)
BASOPHILS NFR BLD AUTO: 0 %
BILIRUB SERPL-MCNC: 0.4 MG/DL (ref 0–1.2)
BUN SERPL-MCNC: 12 MG/DL (ref 8–27)
BUN/CREAT SERPL: 11 (ref 12–28)
CALCIUM SERPL-MCNC: 9.2 MG/DL (ref 8.7–10.3)
CHLORIDE SERPL-SCNC: 99 MMOL/L (ref 96–106)
CO2 SERPL-SCNC: 25 MMOL/L (ref 20–29)
CREAT SERPL-MCNC: 1.07 MG/DL (ref 0.57–1)
EGFRCR SERPLBLD CKD-EPI 2021: 59 ML/MIN/1.73
EOSINOPHIL # BLD AUTO: 0.3 X10E3/UL (ref 0–0.4)
EOSINOPHIL NFR BLD AUTO: 3 %
ERYTHROCYTE [DISTWIDTH] IN BLOOD BY AUTOMATED COUNT: 17.2 % (ref 11.7–15.4)
FERRITIN SERPL-MCNC: 22 NG/ML (ref 15–150)
FOLATE SERPL-MCNC: 11.9 NG/ML
GLOBULIN SER CALC-MCNC: 3 G/DL (ref 1.5–4.5)
GLUCOSE SERPL-MCNC: 121 MG/DL (ref 70–99)
HCT VFR BLD AUTO: 35.9 % (ref 34–46.6)
HGB BLD-MCNC: 11.1 G/DL (ref 11.1–15.9)
IMM GRANULOCYTES # BLD AUTO: 0 X10E3/UL (ref 0–0.1)
IMM GRANULOCYTES NFR BLD AUTO: 0 %
IRON SERPL-MCNC: 32 UG/DL (ref 27–159)
LYMPHOCYTES # BLD AUTO: 2.8 X10E3/UL (ref 0.7–3.1)
LYMPHOCYTES NFR BLD AUTO: 26 %
MCH RBC QN AUTO: 27.4 PG (ref 26.6–33)
MCHC RBC AUTO-ENTMCNC: 30.9 G/DL (ref 31.5–35.7)
MCV RBC AUTO: 89 FL (ref 79–97)
METHYLMALONATE SERPL-SCNC: 187 NMOL/L (ref 0–378)
MONOCYTES # BLD AUTO: 0.6 X10E3/UL (ref 0.1–0.9)
MONOCYTES NFR BLD AUTO: 5 %
NEUTROPHILS # BLD AUTO: 7.1 X10E3/UL (ref 1.4–7)
NEUTROPHILS NFR BLD AUTO: 66 %
PLATELET # BLD AUTO: 399 X10E3/UL (ref 150–450)
POTASSIUM SERPL-SCNC: 3.7 MMOL/L (ref 3.5–5.2)
PREALB SERPL-MCNC: 17 MG/DL (ref 10–36)
PROT SERPL-MCNC: 6.9 G/DL (ref 6–8.5)
RBC # BLD AUTO: 4.05 X10E6/UL (ref 3.77–5.28)
SODIUM SERPL-SCNC: 139 MMOL/L (ref 134–144)
VIT B1 BLD-SCNC: 93 NMOL/L (ref 66.5–200)
WBC # BLD AUTO: 10.8 X10E3/UL (ref 3.4–10.8)

## 2025-06-09 ENCOUNTER — RESULTS FOLLOW-UP (OUTPATIENT)
Dept: BARIATRICS/WEIGHT MGMT | Facility: CLINIC | Age: 60
End: 2025-06-09
Payer: COMMERCIAL

## 2025-06-09 DIAGNOSIS — K21.9 GERD WITHOUT ESOPHAGITIS: Primary | ICD-10-CM

## 2025-06-09 RX ORDER — ERGOCALCIFEROL 1.25 MG/1
50000 CAPSULE, LIQUID FILLED ORAL
Qty: 12 CAPSULE | Refills: 0 | Status: SHIPPED | OUTPATIENT
Start: 2025-06-09 | End: 2025-08-26

## 2025-06-09 NOTE — LETTER
Iris Varma       [address] [facility] [address]      June 9, 2025      Dear Iris,    Your recent labs have been reviewed and the following instructions were given:      [x]  Increase Protein intake to 100 grams per day      [x]  Start Vitamin D 47790AZ by mouth weekly x 3 months - prescription sent to your pharmacy     [x]  Discuss results with primary care provider. High Creatinine, low GFR      A copy of your recent labs are included.  Please call [phone] with any questions or concerns.       Thank you!      [facility]    Resulted Orders   CBC & Differential   Result Value Ref Range    WBC 10.8 3.4 - 10.8 x10E3/uL    RBC 4.05 3.77 - 5.28 x10E6/uL    Hemoglobin 11.1 11.1 - 15.9 g/dL    Hematocrit 35.9 34.0 - 46.6 %    MCV 89 79 - 97 fL    MCH 27.4 26.6 - 33.0 pg    MCHC 30.9 (L) 31.5 - 35.7 g/dL    RDW 17.2 (H) 11.7 - 15.4 %    Platelets 399 150 - 450 x10E3/uL    Neutrophil Rel % 66 Not Estab. %    Lymphocyte Rel % 26 Not Estab. %    Monocyte Rel % 5 Not Estab. %    Eosinophil Rel % 3 Not Estab. %    Basophil Rel % 0 Not Estab. %    Neutrophils Absolute 7.1 (H) 1.4 - 7.0 x10E3/uL    Lymphocytes Absolute 2.8 0.7 - 3.1 x10E3/uL    Monocytes Absolute 0.6 0.1 - 0.9 x10E3/uL    Eosinophils Absolute 0.3 0.0 - 0.4 x10E3/uL    Basophils Absolute 0.0 0.0 - 0.2 x10E3/uL    Immature Granulocyte Rel % 0 Not Estab. %    Immature Grans Absolute 0.0 0.0 - 0.1 x10E3/uL    Narrative    Performed at:  01 - [facility], [place], [place]  [id number]  : Leonid Wilder PhD, Phone:  [phone]  Patient Fasting:  N    Comprehensive Metabolic Panel   Result Value Ref Range    Glucose 121 (H) 70 - 99 mg/dL    BUN 12 8 - 27 mg/dL    Creatinine 1.07 (H) 0.57 - 1.00 mg/dL    EGFR Result 59 (L) >59 mL/min/1.73    BUN/Creatinine Ratio 11 (L) 12 - 28    Sodium 139 134 - 144 mmol/L    Potassium 3.7 3.5 - 5.2 mmol/L    Chloride 99 96 - 106 mmol/L    Total CO2 25 20 - 29 mmol/L     Calcium 9.2 8.7 - 10.3 mg/dL    Total Protein 6.9 6.0 - 8.5 g/dL    Albumin 3.9 3.8 - 4.9 g/dL    Globulin 3.0 1.5 - 4.5 g/dL    Total Bilirubin 0.4 0.0 - 1.2 mg/dL    Alkaline Phosphatase 118 44 - 121 IU/L    AST (SGOT) 18 0 - 40 IU/L    ALT (SGPT) 12 0 - 32 IU/L    Narrative    Performed at:  01 - [address]  [id number]  : Leonid Wilder PhD, Phone:  [phone]  Patient Fasting:  N    Ferritin   Result Value Ref Range    Ferritin 22 15 - 150 ng/mL    Narrative    Performed at:  [address] - [address]  [id number]  : Leonid Wilder PhD, Phone:  [phone]  Patient Fasting:  N    Folate   Result Value Ref Range    Folate 11.9 >3.0 ng/mL      Comment:      A serum folate concentration of less than 3.1 ng/mL is  considered to represent clinical deficiency.      Narrative    Performed at:  [address] - [address]  [id number]  : Leonid Wilder PhD, Phone:  [phone]  Patient Fasting:  N    Iron   Result Value Ref Range    Iron 32 27 - 159 ug/dL    Narrative    Performed at:  [address] - [address]  [id number]  : Leonid Wilder PhD, Phone:  [phone]  Patient Fasting:  N    Methylmalonic Acid, Serum   Result Value Ref Range    Methylmalonic Acid 187 0 - 378 nmol/L    Narrative    Test(s) 207915-Yyprizzdlneax Acid, Serum  was developed and its performance characteristics determined  by [facility]. It has not been cleared or approved by the Food  and Drug Administration.  Performed at:  [address] - [address]  [id number]  : Leilani Huerta MD, Phone:  [phone]  Patient Fasting:  N    Prealbumin   Result Value Ref Range    Prealbumin 17 10 - 36 mg/dL    Narrative    Performed at:  [address] - [address]  [id number]  : Leonid Wilder PhD, Phone:  [phone]  Patient  Fasting:  N    Vitamin B1, Whole Blood   Result Value Ref Range    Vitamin B1, Whole Blood 93.0 66.5 - 200.0 nmol/L    Narrative    Test(s) 121188-Vit. B1, Whole Blood  was developed and its performance characteristics determined  by [facility]. It has not been cleared or approved by the Food  and Drug Administration.  Performed at:  02 - [facility]  [id number]  : Leilani Huerta MD, Phone:  [phone]  Patient Fasting:  N    Vitamin D,25-Hydroxy   Result Value Ref Range    25 Hydroxy, Vitamin D 18.9 (L) 30.0 - 100.0 ng/mL      Comment:      Vitamin D deficiency has been defined by the [place] of  Medicine and an Endocrine Society practice guideline as a  level of serum 25-OH vitamin D less than 20 ng/mL (1,2).  The Endocrine Society went on to further define vitamin D  insufficiency as a level between 21 and 29 ng/mL (2).  1. IOM ([place] of [employer]). 2010. Dietary reference     intakes for calcium and D. [place]: The     National Academies Press.  2. Boni MF, Nellie NC, Jacobo AMADOR, et al.     Evaluation, treatment, and prevention of vitamin D     deficiency: an Endocrine Society clinical practice     guideline. JCEM. 2011 Jul; 96(7):1911-30.      Narrative    Performed at:  01 - [facility]  [id number]  : Leonid Wilder PhD, Phone:  [phone]  Patient Fasting:  N

## 2025-06-10 ENCOUNTER — HOSPITAL ENCOUNTER (OUTPATIENT)
Dept: GENERAL RADIOLOGY | Facility: HOSPITAL | Age: 60
Discharge: HOME | End: 2025-06-10
Admitting: NURSE PRACTITIONER
Payer: COMMERCIAL

## 2025-06-10 DIAGNOSIS — K21.9 GERD WITHOUT ESOPHAGITIS: ICD-10-CM

## 2025-06-10 PROCEDURE — 74240 X-RAY XM UPR GI TRC 1CNTRST: CPT

## 2025-06-10 RX ADMIN — BARIUM SULFATE 183 ML: 960 POWDER, FOR SUSPENSION ORAL at 10:55

## 2025-06-13 NOTE — TELEPHONE ENCOUNTER
----- Message from Jose Tyler sent at 6/13/2025 10:55 AM EDT -----  Regarding: RE: Please Advise  Yes, EGD with me, thanks!  ----- Message -----  From: Pina Marc APRN  Sent: 6/13/2025  10:29 AM EDT  To: Jose Tyler MD  Subject: Please Advise                                    s/p LSG/HHR 8/2013 [facility], s/p lap recurrent HHR w/ partial gastrectomy 1/2020 [facility].      LOV 2/13/24 w/ Dr. Tyler: having issues with GERD, gastroparesis w/ abd pain and bloating. Planned to proceed with revision to a Diogenes-en-Y gastric bypass. She ultimately decided against revision.      OV 6/2/25: Having issues w/ vomiting, reflux for the past two months. Vomiting undigested foods, occurs 1x/wk. Reflux constantly. Denies nighttime vomiting/reflux.  Occasional nausea. S/p nahomi. Not   currently on PPI. Takes pepto prn. Denies dysphagia, abdominal pain. Also reports diarrhea. Denies GLP-1 use. Hx of gastroparesis. Omeprazole 40 mg daily rx. UGI results attached.     Last GES 2/2/24: IMPRESSION:  4-hour solid phase gastric emptying study with 84% emptying, consistent with delayed solid phase gastric emptying.     Last EGD 07/2022 with [facility] - no recurrent hiatal hernia, some grade 1 esophagitis without erosions, Z-line 36 cm, some pyloric spasm and antrum biopsies at that time showed erosion and focal acute   inflammation and reactive changes negative for H. Pylori, distal esophageal biopsies were suggestive of reflux otherwise unremarkable.     Is not interested in revision to RNY gastric bypass, would be interested in revision of sleeve, or surgical HHR.    Do you advise EGD as next steps?   ----- Message -----  From: Interface, Rad Results [place] In  Sent: 6/10/2025   5:12 PM EDT  To: YUNIOR Chun

## 2025-06-30 ENCOUNTER — OFFICE VISIT (OUTPATIENT)
Dept: BARIATRICS/WEIGHT MGMT | Facility: CLINIC | Age: 60
End: 2025-06-30
Payer: COMMERCIAL

## 2025-06-30 VITALS
WEIGHT: 139.6 LBS | OXYGEN SATURATION: 96 % | RESPIRATION RATE: 18 BRPM | SYSTOLIC BLOOD PRESSURE: 118 MMHG | DIASTOLIC BLOOD PRESSURE: 84 MMHG | HEIGHT: 58 IN | BODY MASS INDEX: 29.3 KG/M2 | TEMPERATURE: 97.8 F | HEART RATE: 77 BPM

## 2025-06-30 DIAGNOSIS — K21.9 GERD WITHOUT ESOPHAGITIS: Primary | ICD-10-CM

## 2025-06-30 PROCEDURE — 99214 OFFICE O/P EST MOD 30 MIN: CPT

## 2025-06-30 NOTE — PROGRESS NOTES
[facility]  [address]  [address]  [facility] [id number] [phone]        Patient Name:  Iris Varma  :  1965      Date of Visit: 2025        Reason for Visit:  having issues; reflux and vomiting      HPI: Iris Varma is a 59 y.o. female s/p LSG/HHR 2013 [facility], s/p lap recurrent HHR w/ partial gastrectomy 2020 [facility].     LOV 24 w/ Dr. Tyler: was having issues with GERD, gastroparesis w/ abd pain and bloating. Planned to proceed with revision to a Diogenes-en-Y gastric bypass. She ultimately decided against revision.     Having issues w/ vomiting, reflux for the past two months. Vomiting undigested foods, occurs 1x/wk. Reflux constantly. Denies nighttime vomiting/reflux.  Occasional nausea. S/p nahomi. Not currently on PPI. Takes pepto prn. Denies dysphagia, abdominal pain. Also reports diarrhea. Denies GLP-1 use.    Is not interested in revision to RNY gastric bypass, would be interested in revision of sleeve, or surgical HHR.    Getting ??g prot/day.  Not currently tracking. Eating 1 meals per day, plus snacks. Drinking 64 fluid oz/day.      Not taking vitamins.    Physical activity: no routine exercise, currently limited due to recent neck/spine surgery.    --Update 2025--    Patient doing well. Denies changes in medical history. No new medications or hospitalizations reported. No GLP-1 use. Denies recent ASA, NSAIDs, steroids, tobacco use/exposure.     Presurgery weight: 181 pounds. Today's weight is 63.3 kg (139 lb 9.6 oz), today's Body mass index is 29.18 kg/m². And her weight loss since surgery is 43 lbs.    Past Medical History:   Diagnosis Date    Acne     on Minocycline    Allergic rhinitis     Anxiety     Arthritis     Chronic back pain     Depression     Diabetes mellitus     Dyspnea on exertion     Eczema     prn steroid cream    Elevated LFTs     Fatigue     GERD (gastroesophageal reflux disease)     Helicobacter pylori (H.  pylori) infection     remotely, sx improved w/ RX    Hiatal hernia     Hormone replacement therapy (HRT)     s/p TAHBSO    Hyperlipidemia     Hypertension     Hypokalemia     Insomnia     Joint pain     Obesity     Peripheral edema     daily Maxzide    Renal insufficiency     Vitamin D deficiency     Wears glasses      Past Surgical History:   Procedure Laterality Date    BREAST BIOPSY Right 2012     SECTION  , 2003    preeclampsia with both, early deliveries    COLONOSCOPY  2017    ENDOSCOPY N/A 01/10/2020    Procedure: ESOPHAGOGASTRODUODENOSCOPY;  Surgeon: Jose Tyler MD;  Location:  [facility];  Service: General    GASTRECTOMY N/A 01/10/2020    Procedure: GASTRECTOMY LAPAROSCOPIC;  Surgeon: Jose Tyler MD;  Location:  [facility];  Service: General    GASTRIC SLEEVE LAPAROSCOPIC  2013    HHR   [facility]    HIATAL HERNIA REPAIR N/A 01/10/2020    Procedure: HIATAL HERNIA REPAIR LAPAROSCOPIC;  Surgeon: Jose Tyler MD;  Location:  [facility];  Service: General    HYSTERECTOMY      w/ oophorectomy. open    LAPAROSCOPIC CHOLECYSTECTOMY  2018    w/ PAIGE by Dr. Tyler    TONSILLECTOMY       Outpatient Medications Marked as Taking for the 25 encounter (Office Visit) with Clark Julian APRN   Medication Sig Dispense Refill    carvedilol (COREG) 12.5 MG tablet Take 1 tablet by mouth 2 (Two) Times a Day With Meals.      escitalopram (LEXAPRO) 20 MG tablet Take 1 tablet by mouth Daily.      estradiol (MINIVELLE, VIVELLE-DOT) 0.075 MG/24HR patch Daily.      omeprazole (priLOSEC) 40 MG capsule Take 1 capsule by mouth Daily. 90 capsule 1    potassium chloride ER (K-TAB) 20 MEQ tablet controlled-release ER tablet Daily.      triamterene-hydrochlorothiazide (DYAZIDE) 37.5-25 MG per capsule Daily.         Allergies   Allergen Reactions    Ace Inhibitors Cough, Swelling, Unknown - High Severity and Unknown (See Comments)    Lisinopril Swelling     Lips    Amlodipine Itching, Other (See  "Comments), Unknown - High Severity and Unknown (See Comments)     hair loss    hair loss   hair loss       Social History     Socioeconomic History    Marital status:    Tobacco Use    Smoking status: Never    Smokeless tobacco: Never   Vaping Use    Vaping status: Never Used   Substance and Sexual Activity    Alcohol use: No    Drug use: No    Sexual activity: Defer     Social History     Social History Narrative    Not on file       /84 (BP Location: Left arm, Patient Position: Sitting)   Pulse 77   Temp 97.8 °F (36.6 °C)   Resp 18   Ht 147.3 cm (58\")   Wt 63.3 kg (139 lb 9.6 oz)   SpO2 96%   BMI 29.18 kg/m²     Physical Exam  HENT:      Head: Normocephalic and atraumatic.   Cardiovascular:      Rate and Rhythm: Normal rate and regular rhythm.   Pulmonary:      Effort: Pulmonary effort is normal.      Breath sounds: Normal breath sounds.   Abdominal:      General: Bowel sounds are normal. There is no distension.      Palpations: Abdomen is soft. There is no mass.      Tenderness: There is no abdominal tenderness.   Skin:     General: Skin is warm and dry.   Neurological:      General: No focal deficit present.      Mental Status: She is alert and oriented to person, place, and time.   Psychiatric:         Mood and Affect: Mood normal.         Behavior: Behavior normal.         Assessment:  10 years s/p LSG/HHR 8/2013 [facility], s/p lap recurrent HHR w/ partial gastrectomy 1/2020 [facility].       ICD-10-CM ICD-9-CM   1. GERD without esophagitis  K21.9 530.81       BMI is >= 25 and <30. (Overweight) The following options were offered after discussion;: see plan     Plan: Will schedule EGD @ [facility] w/ Dr. Tyler for further evaluation. Additional input to follow.      Reiterated an all liquid diet 24 hours prior.     Mainta NPO status after midnight.     The risks and benefits of the upper endoscopy were discussed with the patient in detail and all questions were answered.  Possibility of perforation, bleeding, " aspiration, and anesthesia reaction were reviewed.  Patient agrees to proceed.    I spent 30 minutes caring for Iris on this date of service. This time includes time spent by me in the following activities: preparing for the visit, reviewing tests, documenting information in the medical record, care coordination, and ordering procedure(s).         Clark Julian, APRN

## 2025-07-07 ENCOUNTER — OUTSIDE FACILITY SERVICE (OUTPATIENT)
Dept: BARIATRICS/WEIGHT MGMT | Facility: CLINIC | Age: 60
End: 2025-07-07
Payer: COMMERCIAL

## 2025-07-07 PROCEDURE — 43239 EGD BIOPSY SINGLE/MULTIPLE: CPT | Performed by: SURGERY

## 2025-07-07 PROCEDURE — 88305 TISSUE EXAM BY PATHOLOGIST: CPT | Performed by: SURGERY

## 2025-07-08 ENCOUNTER — LAB REQUISITION (OUTPATIENT)
Dept: LAB | Facility: HOSPITAL | Age: 60
End: 2025-07-08
Payer: COMMERCIAL

## 2025-07-08 DIAGNOSIS — K21.9 GASTRO-ESOPHAGEAL REFLUX DISEASE WITHOUT ESOPHAGITIS: ICD-10-CM

## 2025-07-16 ENCOUNTER — TELEPHONE (OUTPATIENT)
Dept: BARIATRICS/WEIGHT MGMT | Facility: CLINIC | Age: 60
End: 2025-07-16
Payer: COMMERCIAL

## 2025-07-22 ENCOUNTER — OFFICE VISIT (OUTPATIENT)
Dept: BARIATRICS/WEIGHT MGMT | Facility: CLINIC | Age: 60
End: 2025-07-22
Payer: COMMERCIAL

## 2025-07-22 VITALS
WEIGHT: 139.8 LBS | HEART RATE: 62 BPM | OXYGEN SATURATION: 99 % | TEMPERATURE: 98.2 F | RESPIRATION RATE: 16 BRPM | HEIGHT: 58 IN | DIASTOLIC BLOOD PRESSURE: 68 MMHG | SYSTOLIC BLOOD PRESSURE: 114 MMHG | BODY MASS INDEX: 29.34 KG/M2

## 2025-07-22 DIAGNOSIS — K31.84 GASTROPARESIS: ICD-10-CM

## 2025-07-22 DIAGNOSIS — K21.9 GERD WITHOUT ESOPHAGITIS: ICD-10-CM

## 2025-07-22 DIAGNOSIS — Z98.84 S/P LAPAROSCOPIC SLEEVE GASTRECTOMY: ICD-10-CM

## 2025-07-22 DIAGNOSIS — K31.1 PYLORIC STENOSIS IN ADULT: Primary | ICD-10-CM

## 2025-07-22 DIAGNOSIS — E55.9 VITAMIN D DEFICIENCY: ICD-10-CM

## 2025-07-22 PROCEDURE — 99214 OFFICE O/P EST MOD 30 MIN: CPT | Performed by: SURGERY
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
## 2025-07-28 ENCOUNTER — ANESTHESIA (OUTPATIENT)
Dept: GASTROENTEROLOGY | Facility: HOSPITAL | Age: 60
DRG: 382 | End: 2025-07-28
Payer: COMMERCIAL

## 2025-07-28 ENCOUNTER — TELEPHONE (OUTPATIENT)
Dept: BARIATRICS/WEIGHT MGMT | Facility: CLINIC | Age: 60
End: 2025-07-28
Payer: COMMERCIAL

## 2025-07-28 ENCOUNTER — HOSPITAL ENCOUNTER (INPATIENT)
Facility: HOSPITAL | Age: 60
LOS: 1 days | Discharge: HOME | DRG: 382 | End: 2025-07-28
Attending: SURGERY | Admitting: SURGERY
Payer: COMMERCIAL

## 2025-07-28 ENCOUNTER — ANESTHESIA EVENT (OUTPATIENT)
Dept: GASTROENTEROLOGY | Facility: HOSPITAL | Age: 60
DRG: 382 | End: 2025-07-28
Payer: COMMERCIAL

## 2025-07-28 VITALS
HEART RATE: 71 BPM | HEIGHT: 58 IN | TEMPERATURE: 97.4 F | SYSTOLIC BLOOD PRESSURE: 121 MMHG | OXYGEN SATURATION: 100 % | WEIGHT: 140 LBS | RESPIRATION RATE: 18 BRPM | BODY MASS INDEX: 29.39 KG/M2 | DIASTOLIC BLOOD PRESSURE: 72 MMHG

## 2025-07-28 PROBLEM — K31.1 PYLORIC STENOSIS IN ADULT: Status: ACTIVE | Noted: 2025-07-28

## 2025-07-28 LAB
ALBUMIN SERPL-MCNC: 4 G/DL (ref 3.5–5.2)
ALBUMIN/GLOB SERPL: 1.3 G/DL
ALP SERPL-CCNC: 101 U/L (ref 39–117)
ALT SERPL W P-5'-P-CCNC: 14 U/L (ref 1–33)
ANION GAP SERPL CALCULATED.3IONS-SCNC: 11.3 MMOL/L (ref 5–15)
AST SERPL-CCNC: 22 U/L (ref 1–32)
BASOPHILS # BLD AUTO: 0.07 10*3/MM3 (ref 0–0.2)
BASOPHILS NFR BLD AUTO: 0.9 % (ref 0–1.5)
BILIRUB SERPL-MCNC: 0.5 MG/DL (ref 0–1.2)
BUN SERPL-MCNC: 12.9 MG/DL (ref 8–23)
BUN/CREAT SERPL: 13.6 (ref 7–25)
CALCIUM SPEC-SCNC: 9.2 MG/DL (ref 8.6–10.5)
CHLORIDE SERPL-SCNC: 102 MMOL/L (ref 98–107)
CO2 SERPL-SCNC: 24.7 MMOL/L (ref 22–29)
CREAT SERPL-MCNC: 0.95 MG/DL (ref 0.57–1)
DEPRECATED RDW RBC AUTO: 47.7 FL (ref 37–54)
EGFRCR SERPLBLD CKD-EPI 2021: 68.7 ML/MIN/1.73
EOSINOPHIL # BLD AUTO: 0.28 10*3/MM3 (ref 0–0.4)
EOSINOPHIL NFR BLD AUTO: 3.6 % (ref 0.3–6.2)
ERYTHROCYTE [DISTWIDTH] IN BLOOD BY AUTOMATED COUNT: 16.4 % (ref 12.3–15.4)
GLOBULIN UR ELPH-MCNC: 3.2 GM/DL
GLUCOSE SERPL-MCNC: 95 MG/DL (ref 65–99)
HCT VFR BLD AUTO: 31 % (ref 34–46.6)
HGB BLD-MCNC: 10.6 G/DL (ref 12–15.9)
IMM GRANULOCYTES # BLD AUTO: 0.01 10*3/MM3 (ref 0–0.05)
IMM GRANULOCYTES NFR BLD AUTO: 0.1 % (ref 0–0.5)
IRON 24H UR-MRATE: 60 MCG/DL (ref 37–145)
LYMPHOCYTES # BLD AUTO: 2.57 10*3/MM3 (ref 0.7–3.1)
LYMPHOCYTES NFR BLD AUTO: 33.2 % (ref 19.6–45.3)
MCH RBC QN AUTO: 27.6 PG (ref 26.6–33)
MCHC RBC AUTO-ENTMCNC: 34.2 G/DL (ref 31.5–35.7)
MCV RBC AUTO: 80.7 FL (ref 79–97)
MONOCYTES # BLD AUTO: 0.55 10*3/MM3 (ref 0.1–0.9)
MONOCYTES NFR BLD AUTO: 7.1 % (ref 5–12)
NEUTROPHILS NFR BLD AUTO: 4.26 10*3/MM3 (ref 1.7–7)
NEUTROPHILS NFR BLD AUTO: 55.1 % (ref 42.7–76)
NRBC BLD AUTO-RTO: 0 /100 WBC (ref 0–0.2)
PLATELET # BLD AUTO: 374 10*3/MM3 (ref 140–450)
PMV BLD AUTO: 10.1 FL (ref 6–12)
POTASSIUM SERPL-SCNC: 3.4 MMOL/L (ref 3.5–5.2)
PREALB SERPL-MCNC: 18.1 MG/DL (ref 20–40)
PROT SERPL-MCNC: 7.2 G/DL (ref 6–8.5)
QT INTERVAL: 438 MS
QTC INTERVAL: 448 MS
RBC # BLD AUTO: 3.84 10*6/MM3 (ref 3.77–5.28)
SODIUM SERPL-SCNC: 138 MMOL/L (ref 136–145)
WBC NRBC COR # BLD AUTO: 7.74 10*3/MM3 (ref 3.4–10.8)

## 2025-07-28 PROCEDURE — 25810000003 SODIUM CHLORIDE 0.9 % SOLUTION: Performed by: NURSE ANESTHETIST, CERTIFIED REGISTERED

## 2025-07-28 PROCEDURE — 25010000002 LIDOCAINE PF 1% 1 % SOLUTION: Performed by: NURSE ANESTHETIST, CERTIFIED REGISTERED

## 2025-07-28 PROCEDURE — C1769 GUIDE WIRE: HCPCS | Performed by: INTERNAL MEDICINE

## 2025-07-28 PROCEDURE — 25010000002 PROPOFOL 10 MG/ML EMULSION: Performed by: NURSE ANESTHETIST, CERTIFIED REGISTERED

## 2025-07-28 PROCEDURE — C1726 CATH, BAL DIL, NON-VASCULAR: HCPCS | Performed by: INTERNAL MEDICINE

## 2025-07-28 PROCEDURE — 0D768ZZ DILATION OF STOMACH, VIA NATURAL OR ARTIFICIAL OPENING ENDOSCOPIC: ICD-10-PCS | Performed by: INTERNAL MEDICINE

## 2025-07-28 PROCEDURE — 85025 COMPLETE CBC W/AUTO DIFF WBC: CPT | Performed by: SURGERY

## 2025-07-28 PROCEDURE — 93005 ELECTROCARDIOGRAM TRACING: CPT | Performed by: NURSE PRACTITIONER

## 2025-07-28 PROCEDURE — 25010000002 POTASSIUM CHLORIDE PER 2 MEQ: Performed by: SURGERY

## 2025-07-28 PROCEDURE — 84134 ASSAY OF PREALBUMIN: CPT | Performed by: SURGERY

## 2025-07-28 PROCEDURE — 25010000002 ONDANSETRON PER 1 MG: Performed by: NURSE ANESTHETIST, CERTIFIED REGISTERED

## 2025-07-28 PROCEDURE — 80053 COMPREHEN METABOLIC PANEL: CPT | Performed by: SURGERY

## 2025-07-28 PROCEDURE — 43245 EGD DILATE STRICTURE: CPT | Performed by: INTERNAL MEDICINE

## 2025-07-28 PROCEDURE — 25010000002 DEXAMETHASONE PER 1 MG: Performed by: NURSE ANESTHETIST, CERTIFIED REGISTERED

## 2025-07-28 PROCEDURE — 83540 ASSAY OF IRON: CPT | Performed by: SURGERY

## 2025-07-28 PROCEDURE — 99223 1ST HOSP IP/OBS HIGH 75: CPT | Performed by: NURSE PRACTITIONER

## 2025-07-28 RX ORDER — LIDOCAINE HYDROCHLORIDE 10 MG/ML
INJECTION, SOLUTION EPIDURAL; INFILTRATION; INTRACAUDAL; PERINEURAL AS NEEDED
Status: DISCONTINUED | OUTPATIENT
Start: 2025-07-28 | End: 2025-07-28 | Stop reason: SURG

## 2025-07-28 RX ORDER — METOCLOPRAMIDE 10 MG/1
10 TABLET ORAL NIGHTLY
Status: DISCONTINUED | OUTPATIENT
Start: 2025-07-28 | End: 2025-07-28 | Stop reason: HOSPADM

## 2025-07-28 RX ORDER — ESCITALOPRAM OXALATE 20 MG/1
20 TABLET ORAL DAILY
Status: CANCELLED | OUTPATIENT
Start: 2025-07-28

## 2025-07-28 RX ORDER — METHOCARBAMOL 500 MG/1
500 TABLET, FILM COATED ORAL AS NEEDED
Status: CANCELLED | OUTPATIENT
Start: 2025-07-28

## 2025-07-28 RX ORDER — METOCLOPRAMIDE 10 MG/1
10 TABLET ORAL NIGHTLY
Qty: 30 TABLET | Refills: 1 | Status: SHIPPED | OUTPATIENT
Start: 2025-07-28

## 2025-07-28 RX ORDER — HYDRALAZINE HYDROCHLORIDE 20 MG/ML
10 INJECTION INTRAMUSCULAR; INTRAVENOUS
Status: DISCONTINUED | OUTPATIENT
Start: 2025-07-28 | End: 2025-07-28 | Stop reason: HOSPADM

## 2025-07-28 RX ORDER — ALBUTEROL SULFATE 0.83 MG/ML
2.5 SOLUTION RESPIRATORY (INHALATION) EVERY 4 HOURS PRN
Status: DISCONTINUED | OUTPATIENT
Start: 2025-07-28 | End: 2025-07-28 | Stop reason: HOSPADM

## 2025-07-28 RX ORDER — PROCHLORPERAZINE 25 MG
25 SUPPOSITORY, RECTAL RECTAL EVERY 12 HOURS PRN
Status: DISCONTINUED | OUTPATIENT
Start: 2025-07-28 | End: 2025-07-28 | Stop reason: HOSPADM

## 2025-07-28 RX ORDER — CARVEDILOL 12.5 MG/1
12.5 TABLET ORAL 2 TIMES DAILY WITH MEALS
Status: CANCELLED | OUTPATIENT
Start: 2025-07-28

## 2025-07-28 RX ORDER — ALPRAZOLAM 0.25 MG
0.25 TABLET ORAL ONCE AS NEEDED
Status: DISCONTINUED | OUTPATIENT
Start: 2025-07-28 | End: 2025-07-28 | Stop reason: HOSPADM

## 2025-07-28 RX ORDER — LIDOCAINE 4 G/G
1 PATCH TOPICAL AS NEEDED
Status: CANCELLED | OUTPATIENT
Start: 2025-07-28

## 2025-07-28 RX ORDER — SIMETHICONE 80 MG
80 TABLET,CHEWABLE ORAL 4 TIMES DAILY PRN
Status: DISCONTINUED | OUTPATIENT
Start: 2025-07-28 | End: 2025-07-28 | Stop reason: HOSPADM

## 2025-07-28 RX ORDER — ACETAMINOPHEN 160 MG/5ML
1000 SOLUTION ORAL EVERY 8 HOURS PRN
Status: DISCONTINUED | OUTPATIENT
Start: 2025-07-28 | End: 2025-07-28 | Stop reason: HOSPADM

## 2025-07-28 RX ORDER — ONDANSETRON 4 MG/1
4 TABLET, ORALLY DISINTEGRATING ORAL EVERY 6 HOURS PRN
Status: DISCONTINUED | OUTPATIENT
Start: 2025-07-28 | End: 2025-07-28 | Stop reason: HOSPADM

## 2025-07-28 RX ORDER — PROMETHAZINE HYDROCHLORIDE 12.5 MG/1
12.5 TABLET ORAL EVERY 6 HOURS PRN
Status: DISCONTINUED | OUTPATIENT
Start: 2025-07-28 | End: 2025-07-28 | Stop reason: HOSPADM

## 2025-07-28 RX ORDER — TRIAMTERENE AND HYDROCHLOROTHIAZIDE 37.5; 25 MG/1; MG/1
1 TABLET ORAL DAILY
Status: CANCELLED | OUTPATIENT
Start: 2025-07-28

## 2025-07-28 RX ORDER — DIPHENHYDRAMINE HYDROCHLORIDE 50 MG/ML
25 INJECTION, SOLUTION INTRAMUSCULAR; INTRAVENOUS EVERY 4 HOURS PRN
Status: DISCONTINUED | OUTPATIENT
Start: 2025-07-28 | End: 2025-07-28 | Stop reason: HOSPADM

## 2025-07-28 RX ORDER — SODIUM CHLORIDE 9 MG/ML
INJECTION, SOLUTION INTRAVENOUS CONTINUOUS PRN
Status: DISCONTINUED | OUTPATIENT
Start: 2025-07-28 | End: 2025-07-28 | Stop reason: SURG

## 2025-07-28 RX ORDER — CYANOCOBALAMIN 1000 UG/ML
1000 INJECTION, SOLUTION INTRAMUSCULAR; SUBCUTANEOUS ONCE
Status: DISCONTINUED | OUTPATIENT
Start: 2025-07-29 | End: 2025-07-28 | Stop reason: HOSPADM

## 2025-07-28 RX ORDER — PROPOFOL 10 MG/ML
VIAL (ML) INTRAVENOUS AS NEEDED
Status: DISCONTINUED | OUTPATIENT
Start: 2025-07-28 | End: 2025-07-28 | Stop reason: SURG

## 2025-07-28 RX ORDER — SODIUM CHLORIDE AND POTASSIUM CHLORIDE 150; 450 MG/100ML; MG/100ML
125 INJECTION, SOLUTION INTRAVENOUS CONTINUOUS
Status: DISCONTINUED | OUTPATIENT
Start: 2025-07-28 | End: 2025-07-28 | Stop reason: HOSPADM

## 2025-07-28 RX ORDER — DEXAMETHASONE SODIUM PHOSPHATE 4 MG/ML
INJECTION, SOLUTION INTRA-ARTICULAR; INTRALESIONAL; INTRAMUSCULAR; INTRAVENOUS; SOFT TISSUE AS NEEDED
Status: DISCONTINUED | OUTPATIENT
Start: 2025-07-28 | End: 2025-07-28 | Stop reason: SURG

## 2025-07-28 RX ORDER — ONDANSETRON 2 MG/ML
INJECTION INTRAMUSCULAR; INTRAVENOUS AS NEEDED
Status: DISCONTINUED | OUTPATIENT
Start: 2025-07-28 | End: 2025-07-28 | Stop reason: SURG

## 2025-07-28 RX ORDER — ONDANSETRON 2 MG/ML
4 INJECTION INTRAMUSCULAR; INTRAVENOUS ONCE AS NEEDED
Status: DISCONTINUED | OUTPATIENT
Start: 2025-07-28 | End: 2025-07-28 | Stop reason: HOSPADM

## 2025-07-28 RX ORDER — PROCHLORPERAZINE EDISYLATE 5 MG/ML
10 INJECTION INTRAMUSCULAR; INTRAVENOUS EVERY 6 HOURS PRN
Status: DISCONTINUED | OUTPATIENT
Start: 2025-07-28 | End: 2025-07-28 | Stop reason: HOSPADM

## 2025-07-28 RX ORDER — SUCCINYLCHOLINE/SOD CL,ISO/PF 200MG/10ML
SYRINGE (ML) INTRAVENOUS AS NEEDED
Status: DISCONTINUED | OUTPATIENT
Start: 2025-07-28 | End: 2025-07-28 | Stop reason: SURG

## 2025-07-28 RX ORDER — ASCORBIC ACID 1000 MG
500 TABLET ORAL
Qty: 120 CAPSULE | Refills: 1 | Status: SHIPPED | OUTPATIENT
Start: 2025-07-28

## 2025-07-28 RX ORDER — LORAZEPAM 1 MG/1
1 TABLET ORAL EVERY 12 HOURS PRN
Status: DISCONTINUED | OUTPATIENT
Start: 2025-07-28 | End: 2025-07-28 | Stop reason: HOSPADM

## 2025-07-28 RX ORDER — PANTOPRAZOLE SODIUM 40 MG/10ML
40 INJECTION, POWDER, LYOPHILIZED, FOR SOLUTION INTRAVENOUS
Status: DISCONTINUED | OUTPATIENT
Start: 2025-07-28 | End: 2025-07-28 | Stop reason: HOSPADM

## 2025-07-28 RX ORDER — IPRATROPIUM BROMIDE AND ALBUTEROL SULFATE 2.5; .5 MG/3ML; MG/3ML
3 SOLUTION RESPIRATORY (INHALATION) ONCE AS NEEDED
Status: DISCONTINUED | OUTPATIENT
Start: 2025-07-28 | End: 2025-07-28 | Stop reason: HOSPADM

## 2025-07-28 RX ORDER — ACETAMINOPHEN 500 MG
1000 TABLET ORAL EVERY 8 HOURS PRN
Status: DISCONTINUED | OUTPATIENT
Start: 2025-07-28 | End: 2025-07-28 | Stop reason: HOSPADM

## 2025-07-28 RX ORDER — PROMETHAZINE HYDROCHLORIDE 12.5 MG/1
12.5 SUPPOSITORY RECTAL EVERY 6 HOURS PRN
Status: DISCONTINUED | OUTPATIENT
Start: 2025-07-28 | End: 2025-07-28 | Stop reason: HOSPADM

## 2025-07-28 RX ORDER — PROCHLORPERAZINE MALEATE 10 MG
10 TABLET ORAL EVERY 6 HOURS PRN
Status: DISCONTINUED | OUTPATIENT
Start: 2025-07-28 | End: 2025-07-28 | Stop reason: HOSPADM

## 2025-07-28 RX ORDER — POTASSIUM CHLORIDE 1500 MG/1
20 TABLET, EXTENDED RELEASE ORAL DAILY
Status: CANCELLED | OUTPATIENT
Start: 2025-07-28

## 2025-07-28 RX ORDER — ONDANSETRON 2 MG/ML
4 INJECTION INTRAMUSCULAR; INTRAVENOUS EVERY 6 HOURS PRN
Status: DISCONTINUED | OUTPATIENT
Start: 2025-07-28 | End: 2025-07-28 | Stop reason: HOSPADM

## 2025-07-28 RX ADMIN — PROPOFOL 50 MG: 10 INJECTION, EMULSION INTRAVENOUS at 14:09

## 2025-07-28 RX ADMIN — DEXAMETHASONE SODIUM PHOSPHATE 4 MG: 4 INJECTION, SOLUTION INTRA-ARTICULAR; INTRALESIONAL; INTRAMUSCULAR; INTRAVENOUS; SOFT TISSUE at 14:16

## 2025-07-28 RX ADMIN — PANTOPRAZOLE SODIUM 40 MG: 40 INJECTION, POWDER, FOR SOLUTION INTRAVENOUS at 11:58

## 2025-07-28 RX ADMIN — LIDOCAINE HYDROCHLORIDE 50 MG: 10 INJECTION, SOLUTION EPIDURAL; INFILTRATION; INTRACAUDAL at 14:01

## 2025-07-28 RX ADMIN — ONDANSETRON 4 MG: 2 INJECTION, SOLUTION INTRAMUSCULAR; INTRAVENOUS at 14:16

## 2025-07-28 RX ADMIN — SODIUM CHLORIDE: 9 INJECTION, SOLUTION INTRAVENOUS at 13:59

## 2025-07-28 RX ADMIN — Medication 100 MG: at 14:01

## 2025-07-28 RX ADMIN — POTASSIUM CHLORIDE AND SODIUM CHLORIDE 125 ML/HR: 450; 150 INJECTION, SOLUTION INTRAVENOUS at 11:56

## 2025-07-28 RX ADMIN — PROPOFOL 200 MG: 10 INJECTION, EMULSION INTRAVENOUS at 14:01

## 2025-07-28 NOTE — ANESTHESIA PREPROCEDURE EVALUATION
Anesthesia Evaluation     Patient summary reviewed and Nursing notes reviewed   NPO Solid Status: > 8 hours  NPO Liquid Status: > 2 hours           Airway   Mallampati: III  TM distance: >3 FB  Neck ROM: full  Possible difficult intubation  Dental      Pulmonary    (-) shortness of breath, not a smoker  Cardiovascular     ECG reviewed    (+) hypertension, hyperlipidemia  (-) past MI, dysrhythmias, angina, cardiac stents    ROS comment: ECG NSR NS TW abn     ECHO 2024 LV small. proximal septum is thickened,  no evidence LVOT obstruction. EF  65%. Ow normal;     Combined ECG/SPECT: 2024 normal nuclear stress test. : No ischemic ST changes occurred with stress. normal MPS Gated SPECT images  normal systolic function.  Regional wall motion is normal.  EF: >=70%.       Neuro/Psych  (+) psychiatric history  (-) seizures, CVA  GI/Hepatic/Renal/Endo    (+) obesity, morbid obesity, hiatal hernia, GERD  (-) no renal disease, diabetes    Musculoskeletal     Abdominal    Substance History      OB/GYN          Other   arthritis,     ROS/Med Hx Other: HCT 31       Phys Exam Other: Intubating stylet and Bougie  Blade: Miller2  ETT size (mm): 6.5   grade III - view of epiglottis only                    Anesthesia Plan    ASA 3     general   Rapid sequence  (RSI CP ETT   Precious)  intravenous induction     Anesthetic plan, risks, benefits, and alternatives have been provided, discussed and informed consent has been obtained with: patient.    Plan discussed with CRNA.    CODE STATUS:    Code Status (Patient has no pulse and is not breathing): CPR (Attempt to Resuscitate)  Medical Interventions (Patient has pulse or is breathing): Full Support  Level Of Support Discussed With: Patient

## 2025-07-28 NOTE — PLAN OF CARE
Problem: Adult Inpatient Plan of Care  Goal: Plan of Care Review  7/28/2025 1705 by Alice Guerra RN  Outcome: Progressing  7/28/2025 1037 by Alice Guerra RN  Outcome: Progressing  Goal: Patient-Specific Goal (Individualized)  7/28/2025 1705 by Alice Guerra RN  Outcome: Progressing  7/28/2025 1037 by Alice Guerra RN  Outcome: Progressing  Goal: Absence of Hospital-Acquired Illness or Injury  7/28/2025 1705 by Alice Guerra RN  Outcome: Progressing  7/28/2025 1037 by Alice Guerra RN  Outcome: Progressing  Intervention: Identify and Manage Fall Risk  Recent Flowsheet Documentation  Taken 7/28/2025 1600 by Alice Guerra RN  Safety Promotion/Fall Prevention:   assistive device/personal items within reach   safety round/check completed  Taken 7/28/2025 1400 by Alice Guerra RN  Safety Promotion/Fall Prevention: patient off unit  Taken 7/28/2025 1200 by Alice Guerra RN  Safety Promotion/Fall Prevention:   assistive device/personal items within reach   safety round/check completed  Taken 7/28/2025 1034 by Alice Guerra RN  Safety Promotion/Fall Prevention:   assistive device/personal items within reach   safety round/check completed  Intervention: Prevent Skin Injury  Recent Flowsheet Documentation  Taken 7/28/2025 1600 by Alice Guerra RN  Body Position: dangle, side of bed  Skin Protection: incontinence pads utilized  Taken 7/28/2025 1200 by Alice Guerra RN  Body Position: position changed independently  Taken 7/28/2025 1034 by Alice Guerra RN  Body Position: position changed independently  Goal: Optimal Comfort and Wellbeing  7/28/2025 1705 by Alice Guerra RN  Outcome: Progressing  7/28/2025 1037 by Alice Guerra RN  Outcome: Progressing  Goal: Readiness for Transition of Care  7/28/2025 1705 by Alice Guerra RN  Outcome: Progressing  7/28/2025 1037 by Alice Guerra RN  Outcome: Progressing  Intervention: Mutually Develop Transition  Plan  Recent Flowsheet Documentation  Taken 7/28/2025 1148 by Alice Guerra, RN  Equipment Currently Used at Home: none  Taken 7/28/2025 1039 by Alice Guerra, RN  Transportation Anticipated:   family or friend will provide   car, drives self  Patient/Family Anticipated Services at Transition: none  Patient/Family Anticipates Transition to: home with family   Goal Outcome Evaluation: Pt had EGD with dilation today, pyloric stricture was found and dilated. Pt tolerated GI soft diet for dinner.

## 2025-07-28 NOTE — TELEPHONE ENCOUNTER
Called and spoke with patient, She saw Dr. Tyler in clinic last week - planned admission for further eval. Please notify patient to proceed to [facility] hospital for direct admit - her bed is ready. She will go to Registration for admission. Patient was at first confused, when I called back and informed her that surgical option was not a option at this time she needs EGD with Dilation with Gastro. She understood and states she will go to the hospital for admission

## 2025-07-28 NOTE — PROGRESS NOTES
"Cc: PM check  \"feel great, hungry!\"    She is sitting up in bed getting ready to eat a regular diet.  She says she is very hungry.  She had her EGD with pyloric dilatation to 20 mm with minimal mucosal disruption and significant improvement in pyloric narrowing.  I did discuss plan treatment with Dr. Brunner.  Patient says she is aware and discussed with him including gastroparesis diet, Reglan 10 mg at bedtime and gingerroot 500 mg capsule before meals.  With regards to GERD he recommends continuing PPI and raising the head of the bed with 6 inch blocks under the front post.  No fever or tachycardia blood pressure 121/72.  She looks and feels well would like to go home.  Will discharge home.  Discharge instructions discussed.  Resume preadmission medications and new recommendations from GI.  Follow-up in the office as previously scheduled and call in the meantime with any problems questions or concerns.  See orders.  "
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
## 2025-07-28 NOTE — CONSULTS
[facility]:  Inpatient Gastroenterology Consult      Inpatient Gastroenterology Consult  Consult performed by: Tavares Bey APRN  Consult ordered by: Jose Tyler MD  Reason for consult: Pyloric stenosis, gastroparesis, history of LSG, herniorrhaphy x 2    Referring Provider: Jose Tyler MD    PCP: Rupert Quevedo PA    Chief Complaint: GERD, nausea/vomiting    History of present illness:    Iris Varma is a 59 y.o. female who is admitted with ongoing issues with GERD and significant nausea and vomiting.  Patient notes she has a complex past medical history stemming from a prior laparoscopic sleeve gastrectomy and subsequent hiatal herniorrhaphy.  Reports that following the hernia repair, felt well for a few years before ultimately having worsening GERD/dyspepsia.  Reports that she has periodically vomiting undigested food products.  Significant epigastric fullness with certain meals including high-fat meals.  Does not note any significant weight loss.  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    Workup to date has included upper GI series in the past as well as an EGD earlier this year which found mild pyloric stenosis scope trauma.  Upper GI series from June this year with a small hiatal hernia with mild to moderate reflux.    Remains on PPI therapy.  NSAIDs.  No anticoagulation. Past medical, surgical, social, and family histories are reviewed for accuracy.  No documented alleviating or exacerbating factors.  Does not endorse pain at time of exam.    Allergies:  Ace inhibitors, Lisinopril, and Amlodipine    Scheduled Meds:  [START ON 7/29/2025] cyanocobalamin, 1,000 mcg, Intramuscular, Once  pantoprazole, 40 mg, Intravenous, Q AM         Infusions:  niCARdipine, 5-15 mg/hr  sodium chloride 0.45 % with KCl 20 mEq, 125 mL/hr        PRN Meds:    acetaminophen **OR** acetaminophen     Albuterol Sulfate NEB Orderable    ALPRAZolam    diphenhydrAMINE    [START ON 7/29/2025] ferric gluconate    hydrALAZINE    LORazepam    ondansetron ODT **OR** ondansetron    prochlorperazine **OR** prochlorperazine **OR** prochlorperazine    promethazine **OR** promethazine    simethicone    Home Meds:  Medications Prior to Admission   Medication Sig Dispense Refill Last Dose/Taking    carvedilol (COREG) 12.5 MG tablet Take 1 tablet by mouth 2 (Two) Times a Day With Meals.       escitalopram (LEXAPRO) 20 MG tablet Take 1 tablet by mouth Daily.       estradiol (MINIVELLE, VIVELLE-DOT) 0.075 MG/24HR patch Daily.       ibuprofen (ADVIL,MOTRIN) 600 MG tablet Every 6 (Six) Hours As Needed.       lidocaine (LIDODERM) 5 % As Needed.       methocarbamol (ROBAXIN) 500 MG tablet Take 1 tablet by mouth As Needed.   Unknown    omeprazole (priLOSEC) 40 MG capsule Take 1 capsule by mouth Daily. 90 capsule 1     potassium chloride ER (K-TAB) 20 MEQ tablet controlled-release ER tablet Take 1 tablet by mouth Daily.       triamterene-hydrochlorothiazide (DYAZIDE) 37.5-25 MG per capsule Take 1 capsule by mouth Daily.       vitamin D (ERGOCALCIFEROL) 1.25 MG (79546 UT) capsule capsule Take 1 capsule by mouth Every 7 (Seven) Days for 12 doses. 12 capsule 0        ROS: Review of Systems   Constitutional:  Positive for activity change and appetite change. Negative for chills, diaphoresis, fatigue, fever and unexpected weight change.   HENT:  Negative for sore throat, trouble swallowing and voice change.         + Hoarseness   Eyes: Negative.    Respiratory:  Negative for apnea, cough, choking, chest tightness, shortness of breath, wheezing and stridor.    Cardiovascular:  Negative for chest pain, palpitations and leg swelling.   Gastrointestinal:  Positive for abdominal distention, abdominal pain, nausea and vomiting. Negative for anal bleeding, blood in stool, constipation, diarrhea and rectal pain.   Endocrine: Negative.     Genitourinary: Negative.    Musculoskeletal: Negative.    Skin: Negative.    Allergic/Immunologic: Negative.    Neurological: Negative.    Hematological:  Negative for adenopathy. Does not bruise/bleed easily.   Psychiatric/Behavioral: Negative.     All other systems reviewed and are negative.      PAST MED HX:  Past Medical History:   Diagnosis Date    Acne     on Minocycline    Allergic rhinitis     Anxiety     Arthritis     Chronic back pain     Depression     Diabetes mellitus     Dyspnea on exertion     Eczema     prn steroid cream    Elevated LFTs     Fatigue     Gastroparesis     GES  emptying time 147 minutes    GERD (gastroesophageal reflux disease)     Helicobacter pylori (H. pylori) infection     remotely, sx improved w/ RX    Hiatal hernia     Hormone replacement therapy (HRT)     s/p TAHBSO    Hyperlipidemia     Hypertension     Hypokalemia     Insomnia     Joint pain     Obesity     Peripheral edema     daily Maxzide    Pyloric stenosis     EGD Dr. Tyler     Renal insufficiency     Vitamin D deficiency     Wears glasses        PAST SURG HX:  Past Surgical History:   Procedure Laterality Date    BREAST BIOPSY Right 2012    CERVICAL SPINE SURGERY  2025    [facility]     SECTION  ,     preeclampsia with both, early deliveries    COLONOSCOPY      ENDOSCOPY N/A 01/10/2020    Procedure: ESOPHAGOGASTRODUODENOSCOPY;  Surgeon: Jose Tyler MD;  Location:  [facility];  Service: General    GASTRECTOMY N/A 01/10/2020    Procedure: GASTRECTOMY LAPAROSCOPIC;  Surgeon: Jose Tyler MD;  Location:  [facility];  Service: General    GASTRIC SLEEVE LAPAROSCOPIC  2013    R   [facility]    HIATAL HERNIA REPAIR N/A 01/10/2020    Procedure: HIATAL HERNIA REPAIR LAPAROSCOPIC;  Surgeon: Jose Tyler MD;  Location:  [facility];  Service: General    HYSTERECTOMY      w/ oophorectomy. open    LAPAROSCOPIC CHOLECYSTECTOMY  2018    w/ PAIGE by Dr. Tyler  "   LUMBAR SPINE SURGERY  04/08/2024    L4-5, [facility]    TONSILLECTOMY  1997    WRIST SURGERY Right 07/21/2025    Tendon repair, [facility]       FAM HX:  Family History   Problem Relation Age of Onset    Cancer Mother     Diabetes Mother     Hypertension Mother     Stroke Mother     Hypertension Sister     Hypertension Brother     Diabetes Maternal Grandmother     Hypertension Maternal Grandfather     Stroke Maternal Grandfather     Cancer Paternal Grandmother        SOC HX:  Social History     Socioeconomic History    Marital status:    Tobacco Use    Smoking status: Never    Smokeless tobacco: Never   Vaping Use    Vaping status: Never Used   Substance and Sexual Activity    Alcohol use: No    Drug use: No    Sexual activity: Defer       PHYSICAL EXAM  /87 (BP Location: Left arm, Patient Position: Sitting)   Pulse 66   Temp 97.6 °F (36.4 °C) (Oral)   Resp 18   Ht 147.3 cm (58\")   Wt 63.5 kg (140 lb)   SpO2 97%   BMI 29.26 kg/m²   Wt Readings from Last 3 Encounters:   07/28/25 63.5 kg (140 lb)   07/22/25 63.4 kg (139 lb 12.8 oz)   06/30/25 63.3 kg (139 lb 9.6 oz)   ,body mass index is 29.26 kg/m².  Physical Exam  Vitals and nursing note reviewed.   Constitutional:       General: She is not in acute distress.     Appearance: Normal appearance. She is normal weight. She is not ill-appearing or toxic-appearing.   HENT:      Head: Normocephalic and atraumatic.   Eyes:      General: No scleral icterus.     Extraocular Movements: Extraocular movements intact.      Conjunctiva/sclera: Conjunctivae normal.      Pupils: Pupils are equal, round, and reactive to light.   Cardiovascular:      Rate and Rhythm: Normal rate and regular rhythm.      Pulses: Normal pulses.      Heart sounds: Normal heart sounds.   Pulmonary:      Effort: Pulmonary effort is normal. No respiratory distress.      Breath sounds: Normal breath sounds.   Abdominal:      General: Abdomen is flat. Bowel " sounds are normal. There is no distension.      Palpations: Abdomen is soft. There is no mass.      Tenderness: There is no abdominal tenderness. There is no guarding or rebound.      Hernia: No hernia is present.   Skin:     General: Skin is warm and dry.      Coloration: Skin is not jaundiced or pale.   Neurological:      General: No focal deficit present.      Mental Status: She is alert and oriented to person, place, and time.   Psychiatric:         Mood and Affect: Mood normal.         Behavior: Behavior normal.         Thought Content: Thought content normal.         Judgment: Judgment normal.         Results Review:   I reviewed the patient's new clinical results.  I reviewed the patient's new imaging results and agree with the interpretation.  I reviewed the patient's other test results and agree with the interpretation  I personally viewed and interpreted the patient's EKG/Telemetry data    Lab Results   Component Value Date    WBC 10.8 06/02/2025    HGB 11.1 06/02/2025    HGB 10.7 (L) 11/03/2024    HGB 11.1 (L) 12/04/2023    HCT 35.9 06/02/2025    MCV 89 06/02/2025     06/02/2025       Lab Results   Component Value Date    INR 1 02/18/2025    INR 1 03/20/2024       Lab Results   Component Value Date    GLUCOSE 121 (H) 06/02/2025    BUN 12 06/02/2025    CREATININE 1.07 (H) 06/02/2025    EGFRIFNONA 65 02/07/2020    EGFRIFAFRI 75 02/07/2020    BCR 11 (L) 06/02/2025     06/02/2025    K 3.7 06/02/2025    CO2 25 06/02/2025    CALCIUM 9.2 06/02/2025    ALBUMIN 3.9 06/02/2025    ALKPHOS 118 06/02/2025    BILITOT 0.4 06/02/2025    ALT 12 06/02/2025    AST 18 06/02/2025     No results found.     ASSESSMENTS/PLANS  1.  History of laparoscopic vertical sleeve gastrectomy  2.  History of hiatal herniorrhaphy  3.  Acquired pyloric stenosis leading to above  4.  Gastroparesis, related to above  5.  GERD.    Iris Varma is a 59 y.o. female who presents to hospital with ongoing issues with nausea and  vomiting epigastric fullness; recent EGD with findings of pyloric stenosis which is likely exacerbating her gastroparesis like symptoms.  Recommend EGD with dilation today.  Additionally, recommend gastroparesis dietary education and management.    >>> N.p.o.  >>> EGD with dilation  >>> IV PPI twice daily  >>> Gastroparesis dietary and lifestyle alterations encouraged  Ambulatory referral to RD discussed.   >>> Jennifer root 500 mg p.o. 3 times daily prior to meals  >>> Reports that Reglan made her sleepy  Instructed to use p.o. Reglan at night as an escape agent  Consider trial of intranasal Reglan    I discussed the patient's findings and my recommendations with patient, nursing staff, and consulting provider.    Tavares Bey, YUNIOR  07/28/25  11:08 EDT

## 2025-07-28 NOTE — ANESTHESIA PROCEDURE NOTES
Airway  Reason: elective    Date/Time: 7/28/2025 2:02 PM  Airway not difficult    General Information and Staff    Patient location during procedure: OR  CRNA/CAA: Pinky Arreola CRNA    Indications and Patient Condition  Indications for airway management: airway protection    Preoxygenated: yes  MILS not maintained throughout    Mask difficulty assessment: 1 - vent by mask    Final Airway Details    Final airway type: endotracheal airway      Successful airway: ETT  Cuffed: yes   Successful intubation technique: video laryngoscopy and RSI  Adjuncts used in placement: intubating stylet and cricoid pressure  Endotracheal tube insertion site: oral  Blade: Liriano  Blade size: 3  ETT size (mm): 7.0  Cormack-Lehane Classification: grade IIa - partial view of glottis  Placement verified by: chest auscultation and capnometry   Measured from: lips  ETT/EBT  to lips (cm): 21  Number of attempts at approach: 1  Assessment: lips, teeth, and gum same as pre-op and atraumatic intubation    Additional Comments  Negative epigastric sounds, Breath sound equal bilaterally with symmetric chest rise and fall

## 2025-07-28 NOTE — ANESTHESIA POSTPROCEDURE EVALUATION
Patient: Iris Varma    Procedure Summary       Date: 07/28/25 Room / Location:  [facility] /  [facility]    Anesthesia Start: 1358 Anesthesia Stop: 1425    Procedure: ESOPHAGOGASTRODUODENOSCOPY WITH DILATATION Diagnosis:     Surgeons: Brunner, Mark I, MD Provider: Jose Enrique Storm MD    Anesthesia Type: general ASA Status: 3            Anesthesia Type: general    Vitals  No vitals data found for the desired time range.          Post Anesthesia Care and Evaluation    Patient location during evaluation: PACU  Patient participation: complete - patient participated  Level of consciousness: awake and alert  Pain management: adequate    Airway patency: patent  Anesthetic complications: No anesthetic complications  PONV Status: none  Cardiovascular status: hemodynamically stable and acceptable  Respiratory status: nonlabored ventilation, acceptable and nasal cannula  Hydration status: acceptable

## 2025-07-28 NOTE — BRIEF OP NOTE
ESOPHAGOGASTRODUODENOSCOPY WITH DILATATION  Progress Note    Iris Varma  7/28/2025    EGD reveals normal esophagus.  There is sleeve gastrectomy anatomy in the stomach, which appears healthy.  The pylorus is mildly stenosed, and traversed with gastroscope.  Duodenum normal.  Pylorus was balloon dilated with serial TTS CRE balloons from 15 to 20 mm under direct endoscopic visualization.  After the 20 mm dilation there was minimal mucosal disruption, and significant improvement in pyloric narrowing.    >> Assess response to pyloric dilation.  >> For GERD symptoms, recommend continue PPI and raise head of bed with 6 inch blocks under the front posts.  >> For gastroparesis symptoms, recommend gastroparesis diet, Reglan 10 mg at bedtime, and gingerroot 500 mg capsule before meals.    Mark I. Brunner, MD     Date: 7/28/2025  Time: 14:36 EDT

## (undated) DEVICE — FIRST STEP BEDSIDE ADD WATER KIT - RESEALABLE STAND-UP POUCH, ENDOSCOPIC CLEANING PAD - 1 POUCH: Brand: FIRST STEP BEDSIDE ADD WATER KIT - RESEALABLE STAND-UP POUCH, ENDOSCOPIC CLEANIN

## (undated) DEVICE — PK BARIATRIC 10

## (undated) DEVICE — SAFELINER SUCTION CANISTER 1000CC: Brand: DEROYAL

## (undated) DEVICE — DRN PENRS 1/2X18IN LTX

## (undated) DEVICE — ESOPHAGEAL WIREGUIDED BALLOON DILATATION CATHETER: Brand: CRE WIREGUIDED

## (undated) DEVICE — GOWN,NON-REINFORCED,SIRUS,SET IN SLV,XXL: Brand: MEDLINE

## (undated) DEVICE — HYBRID CO2 TUBING/CAP SET FOR OLYMPUS® SCOPES & CO2 SOURCE: Brand: ERBE

## (undated) DEVICE — TUBING, SUCTION, 1/4" X 10', STRAIGHT: Brand: MEDLINE

## (undated) DEVICE — INTENDED USE FOR SURGICAL MARKING ON INTACT SKIN, ALSO PROVIDES A PERMANENT METHOD OF IDENTIFYING OBJECTS IN THE OPERATING ROOM: Brand: WRITESITE® REGULAR TIP SKIN MARKER

## (undated) DEVICE — CONTN GRAD MEAS TRIANG 32OZ BLK

## (undated) DEVICE — DEV INFL CRE STERIFLATE 60CC DISP

## (undated) DEVICE — ANTIBACTERIAL VIOLET BRAIDED (POLYGLACTIN 910), SYNTHETIC ABSORBABLE SUTURE: Brand: COATED VICRYL

## (undated) DEVICE — TROCAR: Brand: KII FIOS FIRST ENTRY

## (undated) DEVICE — KT ORCA ORCAPOD DISP STRL

## (undated) DEVICE — MSK ENDO PORT O2 POM ELITE CURAPLEX A/

## (undated) DEVICE — AIR/WATER CLEANING VALVES: Brand: AIR/WATER CLEANING VALVES

## (undated) DEVICE — ESOPHAGEAL/PYLORIC/COLONIC WIREGUIDED BALLOON DILATATION CATHETER: Brand: CRE WIREGUIDED

## (undated) DEVICE — THE BITE BLOCK MAXI, LATEX FREE STRAP IS USED TO PROTECT THE ENDOSCOPE INSERTION TUBE FROM BEING BITTEN BY THE PATIENT.

## (undated) DEVICE — 3 RING SUTURE PASSER - 16 CM: Brand: 3 RING SUTURE PASSER - 16 CM

## (undated) DEVICE — ENDOPATH XCEL BLADELESS TROCARS WITH STABILITY SLEEVES: Brand: ENDOPATH XCEL

## (undated) DEVICE — LUBE JELLY FOIL PACK 1.4 OZ: Brand: MEDLINE INDUSTRIES, INC.

## (undated) DEVICE — SOL IRR H2O BO 1000ML STRL

## (undated) DEVICE — GLV SURG SENSICARE MICRO PF LF 8.5 STRL

## (undated) DEVICE — Device

## (undated) DEVICE — ENDOPATH XCEL UNIVERSAL TROCAR STABLILITY SLEEVES: Brand: ENDOPATH XCEL

## (undated) DEVICE — SOLIDIFIER LIQ PREMISORB 1500CC

## (undated) DEVICE — GLV SURG SENSICARE MICRO PF LF 9 STRL

## (undated) DEVICE — APPL HEMOS FOR DELIVERY FLOSEAL

## (undated) DEVICE — MARYLAND JAW LAPAROSCOPIC SEALER/DIVIDER COATED: Brand: LIGASURE

## (undated) DEVICE — COVER,LIGHT HANDLE,FLX,1/PK: Brand: MEDLINE INDUSTRIES, INC.

## (undated) DEVICE — POWER SHELL: Brand: SIGNIA

## (undated) DEVICE — APPL CHLORAPREP W/TINT 26ML BLU

## (undated) DEVICE — [HIGH FLOW INSUFFLATOR,  DO NOT USE IF PACKAGE IS DAMAGED,  KEEP DRY,  KEEP AWAY FROM SUNLIGHT,  PROTECT FROM HEAT AND RADIOACTIVE SOURCES.]: Brand: PNEUMOSURE

## (undated) DEVICE — ENDOPATH 5MM ENDOSCOPIC BLUNT TIP DISSECTORS (12 POUCHES CONTAINING 3 DISSECTORS EACH): Brand: ENDOPATH

## (undated) DEVICE — FLTR PLUMEPORT LAP W/CONN STRL